# Patient Record
Sex: FEMALE | Race: WHITE | NOT HISPANIC OR LATINO | URBAN - METROPOLITAN AREA
[De-identification: names, ages, dates, MRNs, and addresses within clinical notes are randomized per-mention and may not be internally consistent; named-entity substitution may affect disease eponyms.]

---

## 2019-07-24 ENCOUNTER — INPATIENT (INPATIENT)
Facility: HOSPITAL | Age: 84
LOS: 2 days | Discharge: HOME | DRG: 563 | End: 2019-07-27
Attending: HOSPITALIST | Admitting: HOSPITALIST
Payer: MEDICARE

## 2019-07-24 VITALS — WEIGHT: 132.94 LBS | HEIGHT: 60 IN

## 2019-07-24 DIAGNOSIS — E78.5 HYPERLIPIDEMIA, UNSPECIFIED: ICD-10-CM

## 2019-07-24 DIAGNOSIS — Z96.659 PRESENCE OF UNSPECIFIED ARTIFICIAL KNEE JOINT: Chronic | ICD-10-CM

## 2019-07-24 DIAGNOSIS — Z96.641 PRESENCE OF RIGHT ARTIFICIAL HIP JOINT: Chronic | ICD-10-CM

## 2019-07-24 DIAGNOSIS — E87.1 HYPO-OSMOLALITY AND HYPONATREMIA: ICD-10-CM

## 2019-07-24 DIAGNOSIS — Z90.49 ACQUIRED ABSENCE OF OTHER SPECIFIED PARTS OF DIGESTIVE TRACT: Chronic | ICD-10-CM

## 2019-07-24 DIAGNOSIS — S62.114A NONDISPLACED FRACTURE OF TRIQUETRUM [CUNEIFORM] BONE, RIGHT WRIST, INITIAL ENCOUNTER FOR CLOSED FRACTURE: ICD-10-CM

## 2019-07-24 LAB
ALBUMIN SERPL ELPH-MCNC: 4.3 G/DL — SIGNIFICANT CHANGE UP (ref 3.3–5.2)
ALP SERPL-CCNC: 97 U/L — SIGNIFICANT CHANGE UP (ref 40–120)
ALT FLD-CCNC: 15 U/L — SIGNIFICANT CHANGE UP
ANION GAP SERPL CALC-SCNC: 14 MMOL/L — SIGNIFICANT CHANGE UP (ref 5–17)
ANION GAP SERPL CALC-SCNC: 15 MMOL/L — SIGNIFICANT CHANGE UP (ref 5–17)
ANION GAP SERPL CALC-SCNC: 15 MMOL/L — SIGNIFICANT CHANGE UP (ref 5–17)
APTT BLD: 51.2 SEC — HIGH (ref 27.5–36.3)
AST SERPL-CCNC: 21 U/L — SIGNIFICANT CHANGE UP
BILIRUB SERPL-MCNC: 0.6 MG/DL — SIGNIFICANT CHANGE UP (ref 0.4–2)
BUN SERPL-MCNC: 17 MG/DL — SIGNIFICANT CHANGE UP (ref 8–20)
BUN SERPL-MCNC: 17 MG/DL — SIGNIFICANT CHANGE UP (ref 8–20)
BUN SERPL-MCNC: 18 MG/DL — SIGNIFICANT CHANGE UP (ref 8–20)
CALCIUM SERPL-MCNC: 8.9 MG/DL — SIGNIFICANT CHANGE UP (ref 8.6–10.2)
CALCIUM SERPL-MCNC: 9.5 MG/DL — SIGNIFICANT CHANGE UP (ref 8.6–10.2)
CALCIUM SERPL-MCNC: 9.7 MG/DL — SIGNIFICANT CHANGE UP (ref 8.6–10.2)
CHLORIDE SERPL-SCNC: 80 MMOL/L — LOW (ref 98–107)
CHLORIDE SERPL-SCNC: 81 MMOL/L — LOW (ref 98–107)
CHLORIDE SERPL-SCNC: 85 MMOL/L — LOW (ref 98–107)
CO2 SERPL-SCNC: 17 MMOL/L — LOW (ref 22–29)
CO2 SERPL-SCNC: 20 MMOL/L — LOW (ref 22–29)
CO2 SERPL-SCNC: 21 MMOL/L — LOW (ref 22–29)
CREAT SERPL-MCNC: 0.82 MG/DL — SIGNIFICANT CHANGE UP (ref 0.5–1.3)
CREAT SERPL-MCNC: 0.93 MG/DL — SIGNIFICANT CHANGE UP (ref 0.5–1.3)
CREAT SERPL-MCNC: 0.98 MG/DL — SIGNIFICANT CHANGE UP (ref 0.5–1.3)
GLUCOSE SERPL-MCNC: 117 MG/DL — HIGH (ref 70–115)
GLUCOSE SERPL-MCNC: 133 MG/DL — HIGH (ref 70–115)
GLUCOSE SERPL-MCNC: 150 MG/DL — HIGH (ref 70–115)
HCT VFR BLD CALC: 35.4 % — SIGNIFICANT CHANGE UP (ref 34.5–45)
HGB BLD-MCNC: 12.8 G/DL — SIGNIFICANT CHANGE UP (ref 11.5–15.5)
INR BLD: 4.94 RATIO — HIGH (ref 0.88–1.16)
MAGNESIUM SERPL-MCNC: 1.4 MG/DL — LOW (ref 1.6–2.6)
MCHC RBC-ENTMCNC: 32.7 PG — SIGNIFICANT CHANGE UP (ref 27–34)
MCHC RBC-ENTMCNC: 36.2 GM/DL — HIGH (ref 32–36)
MCV RBC AUTO: 90.3 FL — SIGNIFICANT CHANGE UP (ref 80–100)
OSMOLALITY SERPL: 251 MOSMOL/KG — LOW (ref 280–301)
PLATELET # BLD AUTO: 370 K/UL — SIGNIFICANT CHANGE UP (ref 150–400)
POTASSIUM SERPL-MCNC: 3.3 MMOL/L — LOW (ref 3.5–5.3)
POTASSIUM SERPL-MCNC: 3.4 MMOL/L — LOW (ref 3.5–5.3)
POTASSIUM SERPL-MCNC: 3.4 MMOL/L — LOW (ref 3.5–5.3)
POTASSIUM SERPL-SCNC: 3.3 MMOL/L — LOW (ref 3.5–5.3)
POTASSIUM SERPL-SCNC: 3.4 MMOL/L — LOW (ref 3.5–5.3)
POTASSIUM SERPL-SCNC: 3.4 MMOL/L — LOW (ref 3.5–5.3)
PROT SERPL-MCNC: 7.6 G/DL — SIGNIFICANT CHANGE UP (ref 6.6–8.7)
PROTHROM AB SERPL-ACNC: 59.7 SEC — HIGH (ref 10–12.9)
RBC # BLD: 3.92 M/UL — SIGNIFICANT CHANGE UP (ref 3.8–5.2)
RBC # FLD: 11.9 % — SIGNIFICANT CHANGE UP (ref 10.3–14.5)
SODIUM SERPL-SCNC: 115 MMOL/L — CRITICAL LOW (ref 135–145)
SODIUM SERPL-SCNC: 116 MMOL/L — CRITICAL LOW (ref 135–145)
SODIUM SERPL-SCNC: 117 MMOL/L — CRITICAL LOW (ref 135–145)
T3 SERPL-MCNC: 79 NG/DL — LOW (ref 80–200)
T4 AB SER-ACNC: 9.9 UG/DL — SIGNIFICANT CHANGE UP (ref 4.5–12)
TSH SERPL-MCNC: 3.67 UIU/ML — SIGNIFICANT CHANGE UP (ref 0.27–4.2)
URATE SERPL-MCNC: 3.2 MG/DL — SIGNIFICANT CHANGE UP (ref 2.4–5.7)
WBC # BLD: 13.7 K/UL — HIGH (ref 3.8–10.5)
WBC # FLD AUTO: 13.7 K/UL — HIGH (ref 3.8–10.5)

## 2019-07-24 PROCEDURE — 99221 1ST HOSP IP/OBS SF/LOW 40: CPT

## 2019-07-24 PROCEDURE — 99291 CRITICAL CARE FIRST HOUR: CPT

## 2019-07-24 PROCEDURE — 73110 X-RAY EXAM OF WRIST: CPT | Mod: 26,RT

## 2019-07-24 PROCEDURE — 74022 RADEX COMPL AQT ABD SERIES: CPT | Mod: 26

## 2019-07-24 PROCEDURE — 73200 CT UPPER EXTREMITY W/O DYE: CPT | Mod: 26,RT

## 2019-07-24 PROCEDURE — 93010 ELECTROCARDIOGRAM REPORT: CPT

## 2019-07-24 PROCEDURE — 99285 EMERGENCY DEPT VISIT HI MDM: CPT

## 2019-07-24 RX ORDER — SIMVASTATIN 20 MG/1
20 TABLET, FILM COATED ORAL AT BEDTIME
Refills: 0 | Status: DISCONTINUED | OUTPATIENT
Start: 2019-07-24 | End: 2019-07-25

## 2019-07-24 RX ORDER — ACETAMINOPHEN 500 MG
650 TABLET ORAL EVERY 6 HOURS
Refills: 0 | Status: DISCONTINUED | OUTPATIENT
Start: 2019-07-24 | End: 2019-07-27

## 2019-07-24 RX ORDER — POTASSIUM CHLORIDE 20 MEQ
40 PACKET (EA) ORAL ONCE
Refills: 0 | Status: COMPLETED | OUTPATIENT
Start: 2019-07-24 | End: 2019-07-24

## 2019-07-24 RX ORDER — METOPROLOL TARTRATE 50 MG
25 TABLET ORAL
Refills: 0 | Status: DISCONTINUED | OUTPATIENT
Start: 2019-07-24 | End: 2019-07-25

## 2019-07-24 RX ORDER — MAGNESIUM SULFATE 500 MG/ML
2 VIAL (ML) INJECTION
Refills: 0 | Status: COMPLETED | OUTPATIENT
Start: 2019-07-24 | End: 2019-07-25

## 2019-07-24 RX ORDER — SODIUM CHLORIDE 9 MG/ML
1000 INJECTION INTRAMUSCULAR; INTRAVENOUS; SUBCUTANEOUS
Refills: 0 | Status: DISCONTINUED | OUTPATIENT
Start: 2019-07-24 | End: 2019-07-25

## 2019-07-24 RX ORDER — LOSARTAN POTASSIUM 100 MG/1
50 TABLET, FILM COATED ORAL DAILY
Refills: 0 | Status: DISCONTINUED | OUTPATIENT
Start: 2019-07-24 | End: 2019-07-27

## 2019-07-24 RX ORDER — METOPROLOL TARTRATE 50 MG
50 TABLET ORAL
Refills: 0 | Status: DISCONTINUED | OUTPATIENT
Start: 2019-07-25 | End: 2019-07-25

## 2019-07-24 RX ORDER — CHLORHEXIDINE GLUCONATE 213 G/1000ML
1 SOLUTION TOPICAL
Refills: 0 | Status: DISCONTINUED | OUTPATIENT
Start: 2019-07-24 | End: 2019-07-27

## 2019-07-24 RX ADMIN — Medication 50 GRAM(S): at 23:59

## 2019-07-24 RX ADMIN — Medication 40 MILLIEQUIVALENT(S): at 20:31

## 2019-07-24 RX ADMIN — Medication 25 MILLIGRAM(S): at 23:59

## 2019-07-24 RX ADMIN — SODIUM CHLORIDE 50 MILLILITER(S): 9 INJECTION INTRAMUSCULAR; INTRAVENOUS; SUBCUTANEOUS at 21:30

## 2019-07-24 RX ADMIN — Medication 650 MILLIGRAM(S): at 20:00

## 2019-07-24 RX ADMIN — SIMVASTATIN 20 MILLIGRAM(S): 20 TABLET, FILM COATED ORAL at 23:59

## 2019-07-24 RX ADMIN — SODIUM CHLORIDE 50 MILLILITER(S): 9 INJECTION INTRAMUSCULAR; INTRAVENOUS; SUBCUTANEOUS at 20:00

## 2019-07-24 NOTE — ED ADULT TRIAGE NOTE - CHIEF COMPLAINT QUOTE
c/o right wrist injury, fell on Sunday night, c/o difficulty of using right wrist, denies hitting head and loc, c/o twisting muscles on the lower back , pt on Coumadin for afib,

## 2019-07-24 NOTE — H&P ADULT - NSICDXPASTMEDICALHX_GEN_ALL_CORE_FT
PAST MEDICAL HISTORY:  AF (atrial fibrillation)     Benign essential HTN     Dyslipidemia     Osteoporosis

## 2019-07-24 NOTE — H&P ADULT - ASSESSMENT
1: Severe Hyponatremia: mostly from dehydration with Hypochloremia   2: mechanical fall with right wrist close non displaced fracture   3: Hypokalemia  4: Elevated INR with chronic AC with underlying Chronic Afib  5: HTN/Dyslipidemia/ Hypothyroidism     Patient seen and examined   Full code     Nephro/Electrolyte/Acid-Base:   NS started at 50 cc/hour in ED-> Will check Na q2-3 hours for now and target Na rise about 8-10 mEq in first 24 hours-> if more rapid corection might have to treat with D5W/DDAVP  Will get: Following tests as initial w/u for hyponatremia   > Urine: Na, Creatinine, Osmolarity, Urea  > Serum: Osmolarity, TSH, Cortisol   Avoid nephrotoxic agents  Strict I&O with Cr monitoring   Close Electrolyte monitoring and correction as needed     Neuro:   Pain Mx: PRN Tylenol for now   Sedation/Anxiolytic: PRN Xanax     Cardiovascular:   HDS  Would resume Lopressor 50 AM and 25 PM with Losartan and statin   Holding coumadin for now     Resp/Chest:   On RAn-> oxygenating and ventilating fine for now     Gi/Nutrition:   Diet: Regular diet  IV Zofran as needed   Bowel Regimen as needed from tomorrow     ID:   no active issues       Endocrinology:   Will check TSH and f/u with patient's daughter about dose of synthroid as patient is unsure       Haem/Oncology:   Mechanical  DVT ppx only for now  Check INR in AM prior to resuming Coumadin

## 2019-07-24 NOTE — H&P ADULT - NSHPSOCIALHISTORY_GEN_ALL_CORE
From [place]  Denied any Hx of current tobacco, alcohol or recreational substance abuse   used to smoke 35 years ago  Drinks Glass of wine socially

## 2019-07-24 NOTE — ED STATDOCS - OBJECTIVE STATEMENT
82 y/o F pt with no significant PMHx presents to the ED c/o right wrist pain s/p a fall 3 days ago, as well as constipation. She was getting up in the middle of the night to use the bathroom and fell, hitting her wrist on the wall. Pt visited her doctor, and he told her he does not think it is not broken, but she should get a X-ray. Pt was also told that since she is on Warfarin, she should ensure that she does not have a blood clot. She is scheduled to get on a plane to [place], tomorrow and wants to make sure everything is ok. Last bowel movement was 2 days ago, but it was not normal. She took an X-lax last night but had no relief. Pt's appetite is also diminished. Non-Tobacco User. Denies dysuria. No further complaints at this time.

## 2019-07-24 NOTE — ED ADULT NURSE REASSESSMENT NOTE - NS ED NURSE REASSESS COMMENT FT1
Report given to MICU RN Angelica. Awaiting transport at this time. Pt. remains awake, alert, cooperative and calm.

## 2019-07-24 NOTE — ED STATDOCS - CLINICAL SUMMARY MEDICAL DECISION MAKING FREE TEXT BOX
PT FROM [place] PRESENTS TO [place] R WRIST, HURT THREE DAYSA AGO WHEN SHE FELL GETTING UP AT NIGHT TO GO TO [place]. FURTHER HX REVEALS DECREASED APPETITE, MILD CONFUSION, NOT ACTING HERSELF. CONSTIPATION, NOT EATING RIGHT X 1 MONTH. STATES FLYING HOME TOMORROW TO [place]. ON COUMADIN FOR AFIB. LAST INR ONE MONTH AGO. AGREES TO XRAY AND LABS. PE , OTHER THAN WRIST WAS NONSPECIFIC. LABS XRAY. RESULTS HYPONATREMIA, HYPOKALEMAI, FRACTURE TRIQUETRUM. NEEDS ADMIT ICU AND ORTHO CONSULT - CALLED.

## 2019-07-24 NOTE — DISCHARGE NOTE PROVIDER - NSDCFUADDAPPT_GEN_ALL_CORE_FT
Please follow up with PMD, Dr. Richard, Renal and Ortho. Needs to follow up with Dr. Richard within 1 week for repeat BMP.

## 2019-07-24 NOTE — DISCHARGE NOTE PROVIDER - PROVIDER TOKENS
PROVIDER:[TOKEN:[[id number]]] PROVIDER:[TOKEN:[[id number]]],PROVIDER:[TOKEN:[[id number]],FOLLOWUP:[Routine]],FREE:[LAST:[Jose Manuel],FIRST:[Monique],PHONE:[phone]],FAX:[(   )    -],ADDRESS:[[address], [place] [address]],FOLLOWUP:[1 week]]

## 2019-07-24 NOTE — H&P ADULT - NSHPPHYSICALEXAM_GEN_ALL_CORE
ICU Vital Signs Last 24 Hrs  T(C): 36.4 (24 Jul 2019 14:54), Max: 36.4 (24 Jul 2019 14:54)  T(F): 97.6 (24 Jul 2019 14:54), Max: 97.6 (24 Jul 2019 14:54)  HR: 69 (24 Jul 2019 14:54) (69 - 69)  BP: 105/67 (24 Jul 2019 14:54) (105/67 - 105/67)  BP(mean): --  ABP: --  ABP(mean): --  RR: 18 (24 Jul 2019 14:54) (18 - 18)  SpO2: 98% (24 Jul 2019 14:54) (98% - 98%)    General: No acute distress.  Lying in bed comfortably, Cachectic   Neuro: AAO*3, No motor, sensory, or cranial nerve deficit  HEENT: Pupils equal, reactive to light, Oral mucosa dry  PULM: Clear to auscultation bilaterally, no significant adventitious breath sounds   CVS: Irregular rhythm and controlled rate, no murmurs, rubs, or gallops  ABD: Soft, nondistended, nontender, normoactive bowel sounds, no CVA tenderness  EXT: No b/l LE edema, nontender with pedal pulse palpable; Right forearm splinted   SKIN: Warm and well perfused, no acute rashes

## 2019-07-24 NOTE — ED ADULT NURSE REASSESSMENT NOTE - NS ED NURSE REASSESS COMMENT FT1
Handoff received from offgoing RN. Charting as noted. Pt. returned from CT at this time. Pt. received AxOx4, resting in stretcher, in NAD. Vital signs stable and as charted. R. arm in soft black splint placed by ER provider. Pt. awaiting MICU bed at this time. Pt. complaining of 5/10 back pain. PRN Tylenol to be given. 20g IV placed in L. AC by this RN, pending labs sent. IVF and additional ordered meds to be given. RN educated and updated pt on plan of care. Pt verbalized understanding. RN will continue to update pt on plan of care. Pt. safety and comfort measures maintained. Handoff received from offgoing RN. Charting as noted. Pt. returned from CT at this time. Pt. received AxOx4, resting in stretcher, in NAD. Vital signs stable and as charted. R. arm in soft black brace. Pt. awaiting MICU bed at this time. Pt. complaining of 5/10 back pain. PRN Tylenol to be given. 20g IV placed in L. AC by this RN, pending labs sent. IVF and additional ordered meds to be given. RN educated and updated pt on plan of care. Pt verbalized understanding. RN will continue to update pt on plan of care. Pt. safety and comfort measures maintained.

## 2019-07-24 NOTE — ED STATDOCS - PROGRESS NOTE DETAILS
CHANDU Child NOTE: Pt evaluated at bedside. Possible fx noted to carpal bones on XR, will order CT for better eval due to severe osteoarthritis. Pt evaluated prior by intake physician. Otherwise HPI/PE/ROS as noted above. Will follow up plan per intake physician.

## 2019-07-24 NOTE — DISCHARGE NOTE PROVIDER - NSDCFUADDINST_GEN_ALL_CORE_FT
**DO NOT wet or remove velcro brace.  **Elevate the extremity to reduce swelling.  **No strenuous activities or heavy lifting.  **Non-weightbear of the RIGHT hand/wrist  **Follow up with Dr. Ngo within 1 week; or may follow up with hand specialist if patient returns home to [place] within 1 week

## 2019-07-24 NOTE — DISCHARGE NOTE PROVIDER - HOSPITAL COURSE
Pt is 84 y/o female visiting [place] from [place], with PMHx of AFib on warfarin, HLD, HTN (on triamterene diuretic), osteopetrosis, presented with right wrist injury/ pain after mechanical fall and noted to have right wrist fracture incidental hyponatremia Na 115 which thought to be hypovolumic. Pt was admitted to MICU, started on IVFs, Hyponatremia improving Na 121 now- seen in consultation with nephrology. Pt was seen by hand surgery for distal radius fracture- recommended brace/ NWB right hand/ wrist. Pt downgraded to hospitalist service 7/25/19.        Vital Signs Last 24 Hrs    T(C): 36.6 (26 Jul 2019 08:20), Max: 36.8 (25 Jul 2019 16:00)    T(F): 97.9 (26 Jul 2019 08:20), Max: 98.2 (25 Jul 2019 16:00)    HR: 76 (26 Jul 2019 08:20) (57 - 81)    BP: 134/78 (26 Jul 2019 08:20) (108/56 - 134/78)    BP(mean): 87 (25 Jul 2019 19:00) (74 - 91)    RR: 18 (26 Jul 2019 08:20) (14 - 20)    SpO2: 99% (26 Jul 2019 08:20) (95% - 100%)        PHYSICAL EXAM:    GENERAL: Pt sitting comfortably in bedside chair, in NAD.    CHEST/LUNG: Clear to auscultation bilaterally; No wheezing, rhonchi or rales, respirations unlabored     HEART: S1S2+, irregular, No murmurs    ABDOMEN: Soft, Nontender, Nondistended; Bowel sounds present.    Extremities: No LE edema, pulses+, no motor or sensory loss. Brace to right lower arm/ wrist.     PSYCH: normal mood.        Pt stable. Discharge time 35 minutes Pt is 84 y/o female visiting [place] from [place], with PMHx of AFib on warfarin, HLD, HTN (on triamterene diuretic), osteopetrosis, presented with right wrist injury/ pain after mechanical fall and noted to have right wrist fracture incidental hyponatremia Na 115 which thought to be hypovolumic. Pt was admitted to MICU, started on IVFs, Hyponatremia improving Na 121 now- seen in consultation with nephrology. Pt was seen by hand surgery for distal radius fracture- recommended brace/ NWB right hand/ wrist. Pt clinically improving, hyponatremia also improved.         Vital Signs Last 24 Hrs    T(C): 36.6 (26 Jul 2019 08:20), Max: 36.8 (25 Jul 2019 16:00)    T(F): 97.9 (26 Jul 2019 08:20), Max: 98.2 (25 Jul 2019 16:00)    HR: 76 (26 Jul 2019 08:20) (57 - 81)    BP: 134/78 (26 Jul 2019 08:20) (108/56 - 134/78)    BP(mean): 87 (25 Jul 2019 19:00) (74 - 91)    RR: 18 (26 Jul 2019 08:20) (14 - 20)    SpO2: 99% (26 Jul 2019 08:20) (95% - 100%)        PHYSICAL EXAM:    GENERAL: Pt sitting comfortably in bedside chair, in NAD.    CHEST/LUNG: Clear to auscultation bilaterally; No wheezing, rhonchi or rales, respirations unlabored     HEART: S1S2+, irregular, No murmurs    ABDOMEN: Soft, Nontender, Nondistended; Bowel sounds present.    Extremities: No LE edema, pulses+, no motor or sensory loss. Brace to right lower arm/ wrist.     PSYCH: normal mood.        Pt stable. Discharge time 35 minutes Pt is 84 y/o female visiting [place] from [place], with PMHx of AFib on warfarin, HLD, HTN (on triamterene diuretic), osteopetrosis, presented with right wrist injury/ pain after mechanical fall and noted to have right wrist fracture incidental hyponatremia Na 115 which thought to be hypovolumic. Pt was admitted to MICU, started on IVFs, Hyponatremia improving Na 121 now- seen in consultation with nephrology. Pt was seen by hand surgery for distal radius fracture- recommended brace/ NWB right hand/ wrist. Pt clinically improving, hyponatremia also improved to 132. No further HCTZ- Triamterene  & potassium as per nephrology. Losartan to be continued. Maintain regular diet with restricted fluid intake as per nephrology        Vital Signs Last 24 Hrs    T(C): 36.6 (26 Jul 2019 08:20), Max: 36.8 (25 Jul 2019 16:00)    T(F): 97.9 (26 Jul 2019 08:20), Max: 98.2 (25 Jul 2019 16:00)    HR: 76 (26 Jul 2019 08:20) (57 - 81)    BP: 134/78 (26 Jul 2019 08:20) (108/56 - 134/78)    BP(mean): 87 (25 Jul 2019 19:00) (74 - 91)    RR: 18 (26 Jul 2019 08:20) (14 - 20)    SpO2: 99% (26 Jul 2019 08:20) (95% - 100%)        PHYSICAL EXAM:    GENERAL: Pt sitting comfortably in bedside chair, in NAD.    CHEST/LUNG: Clear to auscultation bilaterally; No wheezing, rhonchi or rales, respirations unlabored     HEART: S1S2+, irregular, No murmurs    ABDOMEN: Soft, Nontender, Nondistended; Bowel sounds present.    Extremities: No LE edema, pulses+, no motor or sensory loss. Brace to right lower arm/ wrist.     PSYCH: normal mood.        Pt stable. Discharge time 35 minutes

## 2019-07-24 NOTE — H&P ADULT - NSICDXPASTSURGICALHX_GEN_ALL_CORE_FT
PAST SURGICAL HISTORY:  H/O total knee replacement     History of appendectomy     History of total right hip replacement

## 2019-07-24 NOTE — H&P ADULT - HISTORY OF PRESENT ILLNESS
84 y/o Female who was in her USOH until 2-3 weeks ago when she had respiratory tract viral infection followed by improvement in health travelled to [place] [place] [facility]\"" 4-5 days ago for vacation from [place] with overall not feeling well and had mechanical fall in middle of night with right wrist injury with worsening pain decided to come to ED for management and was found to have Na of 116-> MICU called for admission.   Currently besides nausea wo vomiting and constipation and wrist pain, patient is denying any other complaints.

## 2019-07-24 NOTE — ED ADULT NURSE NOTE - OBJECTIVE STATEMENT
pt alert and oriented x4 came in c/o falling onto right wrist, brace noted on patient. respirations even unlabored. wrist swollen pain to touch. pt ambulates without difficulty. pt educated on plan of care, pt able to successfully teach back plan of care to RN, RN will continue to reeducate pt during hospital stay.

## 2019-07-24 NOTE — DISCHARGE NOTE PROVIDER - CARE PROVIDERS DIRECT ADDRESSES
,DirectAddress_Unknown ,DirectAddress_Unknown,[email or web address],DirectAddress_Unknown

## 2019-07-24 NOTE — CONSULT NOTE ADULT - SUBJECTIVE AND OBJECTIVE BOX
ORTHO-HAND SERVICE    Pt Name: MATHEW AGUIRRE    MRN: [id number]      Patient is a 83y Female presenting to the emergency department with a chief complaint of right wrist pain, found to be hyponatremic and admitted to medicine service.  Patient seen and examined in the MICU.  Consult requested by ED attending after the received a call about patient's CT scan of the wrist which showed a distal radius fracture.  Patient states about 3 days ago, she was at a friend's house when she fell landing on her right hand.  Patient is RHD.  She had immediate pain and swelling.  Pain/swelling/bruising did not improve and was prompted to go the ED by her friends.  She has pain along the radial aspect of the wrist.  Pain worsens with movement of the wrist.  Pain improves with use of the velcro brace which was applied by the ED Physician Assistant.  She denies numbness/tingling/weakness in the fingers.  She denies radiating pain.  She has no other orthopedic concerns.  Patient is scheduled to return home tomorrow back to [place]  .        REVIEW OF SYSTEMS    General: Alert, responsive, in NAD    Skin/Breast: No rashes, no pruritis   	  Ophthalmologic: No visual changes. No redness.   	  ENMT:	No discharge. No swelling.    Respiratory and Thorax: No difficulty breathing. No cough.  	   Cardiovascular: No chest pain. No palpitations.    Gastrointestinal: No abdominal pain. No diarrhea.     Genitourinary: No dysuria. No bleeding.    Musculoskeletal: SEE HPI.    Neurological: No sensory or motor changes.     Psychiatric: No anxiety or depression.    Hematology/Lymphatics: No swelling.    Endocrine: No Hx of diabetes.    ROS is otherwise negative.    PAST MEDICAL & SURGICAL HISTORY:  PAST MEDICAL & SURGICAL HISTORY:  Osteoporosis  Dyslipidemia  AF (atrial fibrillation)  Benign essential HTN  History of total right hip replacement  H/O total knee replacement  History of appendectomy      Allergies: Allergy Status Unknown      Medications: acetaminophen   Tablet .. 650 milliGRAM(s) Oral every 6 hours PRN  chlorhexidine 4% Liquid 1 Application(s) Topical <User Schedule>  losartan 50 milliGRAM(s) Oral daily  magnesium sulfate  IVPB 2 Gram(s) IV Intermittent every 1 hour  metoprolol tartrate 25 milliGRAM(s) Oral <User Schedule>  simvastatin 20 milliGRAM(s) Oral at bedtime  sodium chloride 0.9%. 1000 milliLiter(s) IV Continuous <Continuous>      FAMILY HISTORY:  No significant family history  : non-contributory    Social History:       Daily Height in cm: 152.4 (2019 14:52)    Daily Weight in k.5 (2019 21:30)                            12.8   13.70 )-----------( 370      ( 2019 17:01 )             35.4           115<LL>  |  80<L>  |  17.0  ----------------------------<  133<H>  3.4<L>   |  20.0<L>  |  0.93    Ca    9.5      2019 20:00  Mg     1.4         TPro  7.6  /  Alb  4.3  /  TBili  0.6  /  DBili  x   /  AST  21  /  ALT  15  /  AlkPhos  97          PHYSICAL EXAM:  Appearance: Alert, responsive, in no acute distress.  Musculoskeletal:       Right Upper Extremity:          Inspection:  Mild bruising noted through out the dorsal aspect of the hand and through out the wrist.  Mild swelling over the radial aspect.  No rash on visible skin. Skin is clean, dry and intact. No bleeding. No abrasions. No ulcerations.          Palpation:  Tenderness to palpation along the distal radius.  No other point tenderness through out the wrist, hand or fingers.          Range of motion:  Painful and restricted ROM on wrist flexion/extension and radial/ulnar deviation.  FROM of the fingers          Neurological: Sensation is grossly intact to light touch. 5/5 motor function of all extremities. No focal deficits or weaknesses found.          Vascular: 2+ distal pulses. Cap refill < 2 sec. No signs of venous  insufficiency  or stasis. No extremity ulcerations. No cyanosis.           Imaging Studies:   EXAM:  WRIST-RIGHT                          PROCEDURE DATE:  2019          INTERPRETATION:  CLINICAL HISTORY:Injury with  Pain    TECHNIQUE: AP, lateral,scaphoid and oblique views of the RIGHT wrist were   obtained.    FINDINGS: There is a possible lung    Fracture through the body of the triquetrum carpal bone. There is   degenerative narrowing of the radiocarpal joint space. There is severe   osteoarthritis of the carpal bones of the lunate and capitate with   narrowing sclerosis and severe first metacarpal carpal joint space   osteoarthritis with narrowing sclerosis and deformity of the trapezium   without acute fracture. Distal radius ulna grossly intact. The soft   tissue swelling adjacent to the ulnar styloid and triquetrum. The   remainder of the visualized bones and soft tissues are unremarkable.    IMPRESSION:Suspect triquetral carpal bone fracture. Severe osteoarthritis.        GALE RICHTER M.D., ATTENDING RADIOLOGIST  This document has been electronically signed. 2019  5:41PM         EXAM:  CT WRIST ONLY RT                          PROCEDURE DATE:  2019          INTERPRETATION:  CT WRIST RIGHT dated 2019 7:21 PM     INDICATION: Status post fall with wrist pain.    COMPARISON: Wrist radiographs of the same date    TECHNIQUE: CT imaging of the right wrist was performed. The data was   reformatted in the axial, coronal, and sagittal planes. Additionally, 3-D   reformatted imaging was created at a separate workstation.    FINDINGS:    OSSEOUS STRUCTURES: Tear is a mildly impacted fracture the distal radius   with minimal nondisplaced extension to the articular surface. No   additional fracture is identified. No additional fracture is noted. There   is severe narrowing at the basilar joint with marginal productive   changes. There is narrowing and productive changes at the STT joint.   Moderate narrowing of the lunocapitate articulation. There is narrowing   and spurring at the pisiform-triquetral joint. There is   chondrocalcinosis. Moderate diffuse narrowing and productive changes   across the metacarpophalangeal joints.  SYNOVIUM/ JOINT FLUID: A moderate to large radiocarpal joint effusion is   noted decompressing along the volar aspect of the ulnar wrist.  TENDONS: The tendons are intact. No full-thickness tear or retraction is   noted.  MUSCLES: Normal muscle bulk without intramuscular edema.  NEUROVASCULAR STRUCTURES: Preserved  SUBCUTANEOUS SOFT TISSUES: Mild soft tissue swelling about the wrist.    3-D reformatted imaging confirms these findings.    IMPRESSION:    1.  Nondisplaced mildly impacted, intra-articular distal radial fracture.  2.  Soft tissue swelling.  3.  Degenerative changes as above.  4.  Chondrocalcinosis is nonspecific but can be seen in the setting of   CPPD arthropathy.  5.  Radiocarpal joint effusion.          A/P:  Pt is a  83y Female with right wrist pain   found to have right distal radius fracture    PLAN:   * Findings and treatment options discussed with the patient.  X-rays and CT scan were reviewed  * Patient to continue use of the velcro wrist brace at all times  * Pain control as clinically indicated  * NWB right hand/wrist  * Ice/elevation to help with pain/swelling  * Continue care as per primary team  * No acute orthopedic surgical intervention at this time  * Patient to follow up outpatient within 1 week for repeat x-rays and further treatment plan.  Patient lives in [place] and may follow up with specialist there or Dr. Ngo if she remains on [place]  * Case d/w with Dr. Ngo

## 2019-07-24 NOTE — DISCHARGE NOTE PROVIDER - CARE PROVIDER_API CALL
Isabella Ngo)  Orthopedics  [address] [place] [address], [address]  [place] [address]  Phone: [phone]  Fax: [phone]  Follow Up Time: Isabella Ngo)  Orthopedics  [address] [facility], [address]  [place] [address]  Phone: [phone]  Fax: [phone]  Follow Up Time:     Natalie Martínez)  Internal Medicine; Nephrology  [address] [place] [address]  Phone: [phone]  Fax: [phone]  Follow Up Time: Monique Peguero  [address], [place] [address]  Phone: [phone]  Fax: (   )    -  Follow Up Time: 1 week

## 2019-07-24 NOTE — DISCHARGE NOTE PROVIDER - NSDCCPCAREPLAN_GEN_ALL_CORE_FT
PRINCIPAL DISCHARGE DIAGNOSIS  Diagnosis: Hyponatremia  Assessment and Plan of Treatment: f/u with nephrology and PMD for repeat blood work in 1 week. Educated to not take diuretic. BP stable on current medications      SECONDARY DISCHARGE DIAGNOSES  Diagnosis: Closed nondisplaced fracture of triquetrum of right wrist, initial encounter  Assessment and Plan of Treatment: f/u with Orthopedics outpatient    Diagnosis: Lung nodule  Assessment and Plan of Treatment: incidental finding on Left upper lobe nodule. Low probability of malignancy. Pt aware of need for repeat imaging outpatient PRINCIPAL DISCHARGE DIAGNOSIS  Diagnosis: Hyponatremia  Assessment and Plan of Treatment: f/u with nephrology and PMD for repeat blood work in 1 week. Educated to not take diuretic. BP stable on current medications   No further HCTZ/ Triamterene  & potassium as per nephrology. Losartan to be continued. Maintain regular diet with restricted fluid intake (< 1Liter) as per nephrology      SECONDARY DISCHARGE DIAGNOSES  Diagnosis: Lung nodule  Assessment and Plan of Treatment: incidental finding on Left upper lobe nodule. Low probability of malignancy. Pt aware of need for repeat imaging outpatient    Diagnosis: Closed nondisplaced fracture of triquetrum of right wrist, initial encounter  Assessment and Plan of Treatment: f/u with Orthopedics outpatient

## 2019-07-25 LAB
ANION GAP SERPL CALC-SCNC: 11 MMOL/L — SIGNIFICANT CHANGE UP (ref 5–17)
ANION GAP SERPL CALC-SCNC: 12 MMOL/L — SIGNIFICANT CHANGE UP (ref 5–17)
ANION GAP SERPL CALC-SCNC: 13 MMOL/L — SIGNIFICANT CHANGE UP (ref 5–17)
ANION GAP SERPL CALC-SCNC: 14 MMOL/L — SIGNIFICANT CHANGE UP (ref 5–17)
APPEARANCE UR: CLEAR — SIGNIFICANT CHANGE UP
APTT BLD: 51.1 SEC — HIGH (ref 27.5–36.3)
BILIRUB UR-MCNC: NEGATIVE — SIGNIFICANT CHANGE UP
BUN SERPL-MCNC: 14 MG/DL — SIGNIFICANT CHANGE UP (ref 8–20)
BUN SERPL-MCNC: 15 MG/DL — SIGNIFICANT CHANGE UP (ref 8–20)
CALCIUM SERPL-MCNC: 8.9 MG/DL — SIGNIFICANT CHANGE UP (ref 8.6–10.2)
CALCIUM SERPL-MCNC: 9 MG/DL — SIGNIFICANT CHANGE UP (ref 8.6–10.2)
CALCIUM SERPL-MCNC: 9.4 MG/DL — SIGNIFICANT CHANGE UP (ref 8.6–10.2)
CALCIUM SERPL-MCNC: 9.7 MG/DL — SIGNIFICANT CHANGE UP (ref 8.6–10.2)
CHLORIDE SERPL-SCNC: 87 MMOL/L — LOW (ref 98–107)
CHLORIDE SERPL-SCNC: 88 MMOL/L — LOW (ref 98–107)
CHLORIDE SERPL-SCNC: 90 MMOL/L — LOW (ref 98–107)
CHLORIDE SERPL-SCNC: 95 MMOL/L — LOW (ref 98–107)
CO2 SERPL-SCNC: 18 MMOL/L — LOW (ref 22–29)
CO2 SERPL-SCNC: 19 MMOL/L — LOW (ref 22–29)
COLOR SPEC: YELLOW — SIGNIFICANT CHANGE UP
CREAT ?TM UR-MCNC: 19 MG/DL — SIGNIFICANT CHANGE UP
CREAT SERPL-MCNC: 0.82 MG/DL — SIGNIFICANT CHANGE UP (ref 0.5–1.3)
CREAT SERPL-MCNC: 0.87 MG/DL — SIGNIFICANT CHANGE UP (ref 0.5–1.3)
CREAT SERPL-MCNC: 0.9 MG/DL — SIGNIFICANT CHANGE UP (ref 0.5–1.3)
CREAT SERPL-MCNC: 0.94 MG/DL — SIGNIFICANT CHANGE UP (ref 0.5–1.3)
DIFF PNL FLD: NEGATIVE — SIGNIFICANT CHANGE UP
EPI CELLS # UR: SIGNIFICANT CHANGE UP
GLUCOSE SERPL-MCNC: 113 MG/DL — SIGNIFICANT CHANGE UP (ref 70–115)
GLUCOSE SERPL-MCNC: 118 MG/DL — HIGH (ref 70–115)
GLUCOSE SERPL-MCNC: 122 MG/DL — HIGH (ref 70–115)
GLUCOSE SERPL-MCNC: 125 MG/DL — HIGH (ref 70–115)
GLUCOSE UR QL: NEGATIVE MG/DL — SIGNIFICANT CHANGE UP
HCT VFR BLD CALC: 32.1 % — LOW (ref 34.5–45)
HGB BLD-MCNC: 11.4 G/DL — LOW (ref 11.5–15.5)
INR BLD: 3.84 RATIO — HIGH (ref 0.88–1.16)
KETONES UR-MCNC: NEGATIVE — SIGNIFICANT CHANGE UP
LEUKOCYTE ESTERASE UR-ACNC: ABNORMAL
MAGNESIUM SERPL-MCNC: 1.3 MG/DL — LOW (ref 1.6–2.6)
MAGNESIUM SERPL-MCNC: 2.7 MG/DL — HIGH (ref 1.6–2.6)
MAGNESIUM SERPL-MCNC: 3.1 MG/DL — HIGH (ref 1.6–2.6)
MCHC RBC-ENTMCNC: 31.4 PG — SIGNIFICANT CHANGE UP (ref 27–34)
MCHC RBC-ENTMCNC: 35.5 GM/DL — SIGNIFICANT CHANGE UP (ref 32–36)
MCV RBC AUTO: 88.4 FL — SIGNIFICANT CHANGE UP (ref 80–100)
NITRITE UR-MCNC: NEGATIVE — SIGNIFICANT CHANGE UP
OSMOLALITY UR: 147 MOSM/KG — LOW (ref 300–1000)
PH UR: 7 — SIGNIFICANT CHANGE UP (ref 5–8)
PHOSPHATE SERPL-MCNC: 3 MG/DL — SIGNIFICANT CHANGE UP (ref 2.4–4.7)
PLATELET # BLD AUTO: 322 K/UL — SIGNIFICANT CHANGE UP (ref 150–400)
POTASSIUM SERPL-MCNC: 3.1 MMOL/L — LOW (ref 3.5–5.3)
POTASSIUM SERPL-MCNC: 3.5 MMOL/L — SIGNIFICANT CHANGE UP (ref 3.5–5.3)
POTASSIUM SERPL-MCNC: 3.6 MMOL/L — SIGNIFICANT CHANGE UP (ref 3.5–5.3)
POTASSIUM SERPL-MCNC: 4.6 MMOL/L — SIGNIFICANT CHANGE UP (ref 3.5–5.3)
POTASSIUM SERPL-SCNC: 3.1 MMOL/L — LOW (ref 3.5–5.3)
POTASSIUM SERPL-SCNC: 3.5 MMOL/L — SIGNIFICANT CHANGE UP (ref 3.5–5.3)
POTASSIUM SERPL-SCNC: 3.6 MMOL/L — SIGNIFICANT CHANGE UP (ref 3.5–5.3)
POTASSIUM SERPL-SCNC: 4.6 MMOL/L — SIGNIFICANT CHANGE UP (ref 3.5–5.3)
PROT UR-MCNC: NEGATIVE MG/DL — SIGNIFICANT CHANGE UP
PROTHROM AB SERPL-ACNC: 46 SEC — HIGH (ref 10–12.9)
RBC # BLD: 3.63 M/UL — LOW (ref 3.8–5.2)
RBC # FLD: 11.5 % — SIGNIFICANT CHANGE UP (ref 10.3–14.5)
RBC CASTS # UR COMP ASSIST: NEGATIVE /HPF — SIGNIFICANT CHANGE UP (ref 0–4)
SODIUM SERPL-SCNC: 118 MMOL/L — CRITICAL LOW (ref 135–145)
SODIUM SERPL-SCNC: 121 MMOL/L — LOW (ref 135–145)
SODIUM SERPL-SCNC: 121 MMOL/L — LOW (ref 135–145)
SODIUM SERPL-SCNC: 125 MMOL/L — LOW (ref 135–145)
SODIUM UR-SCNC: 33 MMOL/L — SIGNIFICANT CHANGE UP
SP GR SPEC: 1 — LOW (ref 1.01–1.02)
UROBILINOGEN FLD QL: NEGATIVE MG/DL — SIGNIFICANT CHANGE UP
UUN UR-MCNC: 136 MG/DL — SIGNIFICANT CHANGE UP
WBC # BLD: 10.49 K/UL — SIGNIFICANT CHANGE UP (ref 3.8–10.5)
WBC # FLD AUTO: 10.49 K/UL — SIGNIFICANT CHANGE UP (ref 3.8–10.5)
WBC UR QL: SIGNIFICANT CHANGE UP

## 2019-07-25 PROCEDURE — 99223 1ST HOSP IP/OBS HIGH 75: CPT

## 2019-07-25 PROCEDURE — 99233 SBSQ HOSP IP/OBS HIGH 50: CPT

## 2019-07-25 RX ORDER — POLYETHYLENE GLYCOL 3350 17 G/17G
17 POWDER, FOR SOLUTION ORAL ONCE
Refills: 0 | Status: COMPLETED | OUTPATIENT
Start: 2019-07-25 | End: 2019-07-25

## 2019-07-25 RX ORDER — LOSARTAN POTASSIUM 100 MG/1
1 TABLET, FILM COATED ORAL
Qty: 0 | Refills: 0 | DISCHARGE

## 2019-07-25 RX ORDER — TRIAMTERENE 100 MG/1
0.5 CAPSULE ORAL
Qty: 0 | Refills: 0 | DISCHARGE

## 2019-07-25 RX ORDER — POTASSIUM CHLORIDE 20 MEQ
40 PACKET (EA) ORAL ONCE
Refills: 0 | Status: COMPLETED | OUTPATIENT
Start: 2019-07-25 | End: 2019-07-25

## 2019-07-25 RX ORDER — LEVOTHYROXINE SODIUM 125 MCG
75 TABLET ORAL DAILY
Refills: 0 | Status: DISCONTINUED | OUTPATIENT
Start: 2019-07-25 | End: 2019-07-27

## 2019-07-25 RX ORDER — ATORVASTATIN CALCIUM 80 MG/1
1 TABLET, FILM COATED ORAL
Qty: 0 | Refills: 0 | DISCHARGE

## 2019-07-25 RX ORDER — LEVOTHYROXINE SODIUM 125 MCG
1 TABLET ORAL
Qty: 0 | Refills: 0 | DISCHARGE

## 2019-07-25 RX ORDER — POTASSIUM CHLORIDE 20 MEQ
20 PACKET (EA) ORAL DAILY
Refills: 0 | Status: DISCONTINUED | OUTPATIENT
Start: 2019-07-25 | End: 2019-07-27

## 2019-07-25 RX ORDER — OMEPRAZOLE 10 MG/1
1 CAPSULE, DELAYED RELEASE ORAL
Qty: 0 | Refills: 0 | DISCHARGE

## 2019-07-25 RX ORDER — POTASSIUM CHLORIDE 20 MEQ
10 PACKET (EA) ORAL
Refills: 0 | Status: DISCONTINUED | OUTPATIENT
Start: 2019-07-25 | End: 2019-07-25

## 2019-07-25 RX ORDER — WARFARIN SODIUM 2.5 MG/1
1 TABLET ORAL
Qty: 0 | Refills: 0 | DISCHARGE

## 2019-07-25 RX ORDER — SODIUM CHLORIDE 9 MG/ML
1000 INJECTION INTRAMUSCULAR; INTRAVENOUS; SUBCUTANEOUS
Refills: 0 | Status: DISCONTINUED | OUTPATIENT
Start: 2019-07-25 | End: 2019-07-25

## 2019-07-25 RX ORDER — SENNA PLUS 8.6 MG/1
2 TABLET ORAL AT BEDTIME
Refills: 0 | Status: DISCONTINUED | OUTPATIENT
Start: 2019-07-25 | End: 2019-07-27

## 2019-07-25 RX ORDER — SODIUM CHLORIDE 9 MG/ML
1000 INJECTION INTRAMUSCULAR; INTRAVENOUS; SUBCUTANEOUS
Refills: 0 | Status: DISCONTINUED | OUTPATIENT
Start: 2019-07-25 | End: 2019-07-26

## 2019-07-25 RX ORDER — SODIUM CHLORIDE 9 MG/ML
1 INJECTION INTRAMUSCULAR; INTRAVENOUS; SUBCUTANEOUS THREE TIMES A DAY
Refills: 0 | Status: DISCONTINUED | OUTPATIENT
Start: 2019-07-25 | End: 2019-07-27

## 2019-07-25 RX ORDER — METOPROLOL TARTRATE 50 MG
1 TABLET ORAL
Qty: 0 | Refills: 0 | DISCHARGE

## 2019-07-25 RX ORDER — ALENDRONATE SODIUM 70 MG/1
1 TABLET ORAL
Qty: 0 | Refills: 0 | DISCHARGE

## 2019-07-25 RX ORDER — ATORVASTATIN CALCIUM 80 MG/1
20 TABLET, FILM COATED ORAL AT BEDTIME
Refills: 0 | Status: DISCONTINUED | OUTPATIENT
Start: 2019-07-25 | End: 2019-07-27

## 2019-07-25 RX ADMIN — SODIUM CHLORIDE 1 GRAM(S): 9 INJECTION INTRAMUSCULAR; INTRAVENOUS; SUBCUTANEOUS at 22:10

## 2019-07-25 RX ADMIN — Medication 20 MILLIEQUIVALENT(S): at 16:31

## 2019-07-25 RX ADMIN — ATORVASTATIN CALCIUM 20 MILLIGRAM(S): 80 TABLET, FILM COATED ORAL at 22:10

## 2019-07-25 RX ADMIN — Medication 650 MILLIGRAM(S): at 17:30

## 2019-07-25 RX ADMIN — POLYETHYLENE GLYCOL 3350 17 GRAM(S): 17 POWDER, FOR SOLUTION ORAL at 12:27

## 2019-07-25 RX ADMIN — LOSARTAN POTASSIUM 50 MILLIGRAM(S): 100 TABLET, FILM COATED ORAL at 06:02

## 2019-07-25 RX ADMIN — Medication 75 MICROGRAM(S): at 06:00

## 2019-07-25 RX ADMIN — Medication 100 MILLIEQUIVALENT(S): at 06:03

## 2019-07-25 RX ADMIN — Medication 650 MILLIGRAM(S): at 22:10

## 2019-07-25 RX ADMIN — SODIUM CHLORIDE 100 MILLILITER(S): 9 INJECTION INTRAMUSCULAR; INTRAVENOUS; SUBCUTANEOUS at 12:28

## 2019-07-25 RX ADMIN — SODIUM CHLORIDE 100 MILLILITER(S): 9 INJECTION INTRAMUSCULAR; INTRAVENOUS; SUBCUTANEOUS at 18:38

## 2019-07-25 RX ADMIN — CHLORHEXIDINE GLUCONATE 1 APPLICATION(S): 213 SOLUTION TOPICAL at 05:58

## 2019-07-25 RX ADMIN — Medication 40 MILLIEQUIVALENT(S): at 11:27

## 2019-07-25 RX ADMIN — Medication 1 DROP(S): at 11:17

## 2019-07-25 RX ADMIN — Medication 650 MILLIGRAM(S): at 07:01

## 2019-07-25 RX ADMIN — Medication 650 MILLIGRAM(S): at 16:30

## 2019-07-25 RX ADMIN — SODIUM CHLORIDE 100 MILLILITER(S): 9 INJECTION INTRAMUSCULAR; INTRAVENOUS; SUBCUTANEOUS at 20:17

## 2019-07-25 RX ADMIN — Medication 650 MILLIGRAM(S): at 23:00

## 2019-07-25 RX ADMIN — Medication 50 GRAM(S): at 01:07

## 2019-07-25 RX ADMIN — Medication 40 MILLIEQUIVALENT(S): at 06:36

## 2019-07-25 RX ADMIN — SENNA PLUS 2 TABLET(S): 8.6 TABLET ORAL at 22:09

## 2019-07-25 RX ADMIN — Medication 10 MILLIGRAM(S): at 11:17

## 2019-07-25 RX ADMIN — Medication 40 MILLIEQUIVALENT(S): at 18:36

## 2019-07-25 RX ADMIN — Medication 650 MILLIGRAM(S): at 06:01

## 2019-07-25 NOTE — PROGRESS NOTE ADULT - SUBJECTIVE AND OBJECTIVE BOX
MICU downgrade to hospitalist Service Acceptance note:    MATHEW AGUIRRE Female 83y [id number]    Patient is a 83y old  Female who presents with a chief complaint of Hyponatremia (2019 15:22)      MICU Course:  Pt is 82 y/o female visiting [place] from [place], with PMHx of AFib   on warfarin, HTN (on a thiazide diuretic), osteopetrosis HLD, presented with right wrist injury/ pain after mechanical fall and noted to have right wrist fracture incidental hyponatremia Na 115 which thought to be hypovolumic. Pt was admitted to MICU, started on IVFs, Hyponatremia improving Na 121 now- seen in consultation with nephrology. Pt was seen by hand surgery for distal radius fracture- recommended brace/ NWB right hand/ wrist. Pt downgraded to hospitalist service today 19.        PHYSICAL EXAM:    Vital Signs Last 24 Hrs  T(C): 36.5 (2019 11:10), Max: 36.7 (2019 19:50)  T(F): 97.7 (2019 11:10), Max: 98 (2019 19:50)  HR: 67 (2019 16:00) (45 - 81)  BP: 127/67 (2019 16:00) (96/63 - 161/71)  BP(mean): 91 (2019 16:00) (74 - 108)  RR: 16 (2019 16:00) (12 - 25)  SpO2: 96% (2019 16:00) (94% - 100%)    GENERAL: Pt lying comfortably, NAD.  CHEST/LUNG: Clear to auscultation bilaterally; No wheezing.  HEART: S1S2+, irregular, No murmurs.  ABDOMEN: Soft, Nontender, Nondistended; Bowel sounds present.  Extremities: No LE edema, pulses+ no motor r sensory loss.  PSYCH: normal mood.      MEDICATIONS  (STANDING):  atorvastatin 20 milliGRAM(s) Oral at bedtime  chlorhexidine 4% Liquid 1 Application(s) Topical <User Schedule>  levothyroxine 75 MICROGram(s) Oral daily  losartan 50 milliGRAM(s) Oral daily  potassium chloride    Tablet ER 20 milliEquivalent(s) Oral daily  senna 2 Tablet(s) Oral at bedtime  sodium chloride 1 Gram(s) Oral three times a day  sodium chloride 0.9%. 1000 milliLiter(s) (100 mL/Hr) IV Continuous <Continuous>    MEDICATIONS  (PRN):  acetaminophen   Tablet .. 650 milliGRAM(s) Oral every 6 hours PRN Mild Pain (1 - 3), Moderate Pain (4 - 6)  artificial  tears Solution 1 Drop(s) Both EYES every 4 hours PRN Dry Eyes        Labs:  LABS:                        11.4   10.49 )-----------( 322      ( 2019 03:49 )             32.1     07-25    121<L>  |  90<L>  |  15.0  ----------------------------<  125<H>  3.6   |  18.0<L>  |  0.90    Ca    9.4      2019 15:34  Phos  3.0     07-  Mg     2.7     07-    TPro  7.6  /  Alb  4.3  /  TBili  0.6  /  DBili  x   /  AST  21  /  ALT  15  /  AlkPhos  97  07-24    PT/INR - ( 2019 03:49 )   PT: 46.0 sec;   INR: 3.84 ratio         PTT - ( 2019 03:49 )  PTT:51.1 sec    LIVER FUNCTIONS - ( 2019 17:01 )  Alb: 4.3 g/dL / Pro: 7.6 g/dL / ALK PHOS: 97 U/L / ALT: 15 U/L / AST: 21 U/L / GGT: x           Urinalysis Basic - ( 2019 03:44 )    Color: Yellow / Appearance: Clear / S.005 / pH: x  Gluc: x / Ketone: Negative  / Bili: Negative / Urobili: Negative mg/dL   Blood: x / Protein: Negative mg/dL / Nitrite: Negative   Leuk Esterase: Trace / RBC: Negative /HPF / WBC 0-2   Sq Epi: x / Non Sq Epi: Occasional / Bacteria: x MICU downgrade to hospitalist Service Acceptance note:    MATHEW AGUIRRE Female 83y [id number]    Patient is a 83y old  Female who presents with a chief complaint of Hyponatremia (2019 15:22)      MICU Course:  Pt is 82 y/o female visiting [place] from [place], with PMHx of AFib on warfarin, HLD, HTN (on triamterene diuretic), osteopetrosis, presented with right wrist injury/ pain after mechanical fall and noted to have right wrist fracture incidental hyponatremia Na 115 which thought to be hypovolumic. Pt was admitted to MICU, started on IVFs, Hyponatremia improving Na 121 now- seen in consultation with nephrology. Pt was seen by hand surgery for distal radius fracture- recommended brace/ NWB right hand/ wrist. Pt downgraded to hospitalist service today 19.        PHYSICAL EXAM:    Vital Signs Last 24 Hrs  T(C): 36.5 (2019 11:10), Max: 36.7 (2019 19:50)  T(F): 97.7 (2019 11:10), Max: 98 (2019 19:50)  HR: 67 (2019 16:00) (45 - 81)  BP: 127/67 (2019 16:00) (96/63 - 161/71)  BP(mean): 91 (2019 16:00) (74 - 108)  RR: 16 (2019 16:00) (12 - 25)  SpO2: 96% (2019 16:00) (94% - 100%)    GENERAL: Pt lying comfortably, NAD.  CHEST/LUNG: Clear to auscultation bilaterally; No wheezing.  HEART: S1S2+, irregular, No murmurs.  ABDOMEN: Soft, Nontender, Nondistended; Bowel sounds present.  Extremities: No LE edema, pulses+ no motor r sensory loss. Brace to right lower arm/ wrist.   PSYCH: normal mood.      MEDICATIONS  (STANDING):  atorvastatin 20 milliGRAM(s) Oral at bedtime  chlorhexidine 4% Liquid 1 Application(s) Topical <User Schedule>  levothyroxine 75 MICROGram(s) Oral daily  losartan 50 milliGRAM(s) Oral daily  potassium chloride    Tablet ER 20 milliEquivalent(s) Oral daily  senna 2 Tablet(s) Oral at bedtime  sodium chloride 1 Gram(s) Oral three times a day  sodium chloride 0.9%. 1000 milliLiter(s) (100 mL/Hr) IV Continuous <Continuous>    MEDICATIONS  (PRN):  acetaminophen   Tablet .. 650 milliGRAM(s) Oral every 6 hours PRN Mild Pain (1 - 3), Moderate Pain (4 - 6)  artificial  tears Solution 1 Drop(s) Both EYES every 4 hours PRN Dry Eyes        Labs:  LABS:                        11.4   10.49 )-----------( 322      ( 2019 03:49 )             32.1     07-25    121<L>  |  90<L>  |  15.0  ----------------------------<  125<H>  3.6   |  18.0<L>  |  0.90    Ca    9.4      2019 15:34  Phos  3.0     07-  Mg     2.7     07-    TPro  7.6  /  Alb  4.3  /  TBili  0.6  /  DBili  x   /  AST  21  /  ALT  15  /  AlkPhos  97  07-24    PT/INR - ( 2019 03:49 )   PT: 46.0 sec;   INR: 3.84 ratio         PTT - ( 2019 03:49 )  PTT:51.1 sec    LIVER FUNCTIONS - ( 2019 17:01 )  Alb: 4.3 g/dL / Pro: 7.6 g/dL / ALK PHOS: 97 U/L / ALT: 15 U/L / AST: 21 U/L / GGT: x           Urinalysis Basic - ( 2019 03:44 )    Color: Yellow / Appearance: Clear / S.005 / pH: x  Gluc: x / Ketone: Negative  / Bili: Negative / Urobili: Negative mg/dL   Blood: x / Protein: Negative mg/dL / Nitrite: Negative   Leuk Esterase: Trace / RBC: Negative /HPF / WBC 0-2   Sq Epi: x / Non Sq Epi: Occasional / Bacteria: x

## 2019-07-25 NOTE — CONSULT NOTE ADULT - ASSESSMENT
84 yo female with hx of afib on warfarin, HTN (on a thiazide diuretic), osteoperosis, HLD, presented with right wrist fracture after a fall, found to have hyponatremia.     Hyponatremia likely due to poor po intake in addition to thiazide diuretic use. Improving with IV hydration.    Also noted to have 3mm KATHIE nodule on CXR. needs Further W.U,      She expressed understanding of the plan.     DX : Low Solute Intake,     ? Reset Osmostat,    ? Resolving SIADH ( Thiazide )     Plan:  - Avoid HCTZ - For future,     Repeat Urine studies,  Oral Na+ * Kcl,

## 2019-07-25 NOTE — PROGRESS NOTE ADULT - ASSESSMENT
84 yo female with hx of afib on warfarin, HTN (on a thiazide diuretic), osteoperosis, HLD, presented with right wrist fracture after a fall, found to have hyponatremia.     Hyponatremia likely due to poor po intake in addition to thiazide diuretic use. Improving with IV hydration.    Also noted to have 3mm KATHIE nodule on cxr. 84 yo female with hx of afib on warfarin, HTN (on a thiazide diuretic), osteoperosis, HLD, presented with right wrist fracture after a fall, found to have hyponatremia.     Hyponatremia likely due to poor po intake in addition to thiazide diuretic use. Improving with IV hydration.    Also noted to have 3mm KATHIE nodule on cxr.  Patient notified that she need follow up imaging.  She says she live in [place] and would prefer to follow up there for repeat imaging.  We will give her a copy of the CD with the CXR.  She expressed understanding of the plan.     Plan:  - gentle hydration with NS, stop HCTZ  - nephrology following  - ortho following for wrist fracture  - daily INR, restart warfarin when INR 2-3  - holding metoprolol for now due to bradycardia   Will downrade from MICU

## 2019-07-25 NOTE — CONSULT NOTE ADULT - SUBJECTIVE AND OBJECTIVE BOX
Patient is a 83 y old  Female who presents with a chief complaint of Hyponatremia (2019 10:42)    HPI:  84 y/o Female who was in her USOH until 2-3 weeks ago when she had respiratory tract viral infection followed by improvement in health travelled to [facility] 4-5 days ago for vacation from [place] with overall not feeling well and had mechanical fall in middle of night with right wrist injury with worsening pain decided to come to ED for management and was found to have Na of 116-> MICU called for admission.   Currently besides nausea , W/O  vomiting and constipation and wrist pain, patient is denying any other complaints. (2019 19:08)    PAST MEDICAL & SURGICAL HISTORY:    Osteoporosis  Dyslipidemia  AF (atrial fibrillation)  Benign essential HTN    History of total right hip replacement  H/O total knee replacement  History of appendectomy    FAMILY HISTORY:  No significant family history    Social History: Non Smoker,   MEDICATIONS  (STANDING):  atorvastatin 20 milliGRAM(s) Oral at bedtime  chlorhexidine 4% Liquid 1 Application(s) Topical <User Schedule>  levothyroxine 75 MICROGram(s) Oral daily  losartan 50 milliGRAM(s) Oral daily  potassium chloride    Tablet ER 20 milliEquivalent(s) Oral daily  senna 2 Tablet(s) Oral at bedtime  sodium chloride 1 Gram(s) Oral three times a day  sodium chloride 0.9%. 1000 milliLiter(s) (100 mL/Hr) IV Continuous <Continuous>    MEDICATIONS  (PRN):  acetaminophen   Tablet .. 650 milliGRAM(s) Oral every 6 hours PRN Mild Pain (1 - 3), Moderate Pain (4 - 6)  artificial  tears Solution 1 Drop(s) Both EYES every 4 hours PRN Dry Eyes    Allergies    Allergy Status Unknown    REVIEW OF SYSTEMS:    CONSTITUTIONAL: No fever, weight loss, + fatigue  EYES: No eye pain, visual disturbances, or discharge  ENMT:  No difficulty hearing, tinnitus, vertigo; No sinus or throat pain  NECK: No pain or stiffness  BREASTS: No pain, masses, or nipple discharge  RESPIRATORY: No cough, wheezing, chills or hemoptysis; No shortness of breath  CARDIOVASCULAR: No chest pain, palpitations, dizziness, or leg swelling  GASTROINTESTINAL: No abdominal or epigastric pain. + nausea,  No vomiting, or hematemesis; No diarrhea or constipation. No melena or hematochezia.  GENITOURINARY: No dysuria, frequency, hematuria, or incontinence  NEUROLOGICAL: No headaches, memory loss, loss of strength, numbness, or tremors  SKIN: No itching, burning, rashes, or lesions   LYMPH NODES: No enlarged glands  ENDOCRINE: No heat or cold intolerance; No hair loss  MUSCULOSKELETAL: No joint pain or swelling; No muscle, back, or extremity pain  PSYCHIATRIC: No depression, anxiety, mood swings, or difficulty sleeping  HEME/LYMPH: No easy bruising, or bleeding gums  ALLERGY AND IMMUNOLOGIC: No hives or eczema    Vital Signs Last 24 Hrs  T(C): 36.5 (2019 11:10), Max: 36.7 (2019 19:50)  T(F): 97.7 (2019 11:10), Max: 98 (2019 19:50)  HR: 81 (2019 14:00) (45 - 81)  BP: 110/53 (2019 14:00) (96/63 - 161/71)  BP(mean): 77 (2019 14:00) (74 - 108)  RR: 17 (2019 14:00) (12 - 25)  SpO2: 98% (2019 14:00) (94% - 100%)    PHYSICAL EXAM:    GENERAL: NAD, Frail, Elderly, Pale,   HEAD:  Atraumatic, Normocephalic  EYES: EOMI, PERRLA, conjunctiva and sclera clear  ENMT: Moist mucous membranes,   NECK: Supple, No JVD,   NERVOUS SYSTEM:  Alert & Oriented X3,   CHEST/LUNG: Clear to percussion bilaterally; No rales, rhonchi, wheezing, or rubs  HEART: Irregular rate and rhythm; No murmurs, rubs, or gallops  ABDOMEN: Soft, Nontender, Nondistended; Bowel sounds present  EXTREMITIES:  2+ Peripheral Pulses, No clubbing, cyanosis, or edema  LYMPH: No lymphadenopathy noted  SKIN: No rashes or lesions    LABS:                        11.4   10.49 )-----------( 322      ( 2019 03:49 )             32.1     07-    121<L>  |  88<L>  |  14.0  ----------------------------<  113  3.5   |  19.0<L>  |  0.87    Ca    9.7      2019 09:30  Phos  3.0       Mg     2.7         TPro  7.6  /  Alb  4.3  /  TBili  0.6  /  DBili  x   /  AST  21  /  ALT  15  /  AlkPhos  97      PT/INR - ( 2019 03:49 )   PT: 46.0 sec;   INR: 3.84 ratio      PTT - ( 2019 03:49 )  PTT:51.1 sec  Urinalysis Basic - ( 2019 03:44 )    Color: Yellow / Appearance: Clear / S.005 / pH: x  Gluc: x / Ketone: Negative  / Bili: Negative / Urobili: Negative mg/dL   Blood: x / Protein: Negative mg/dL / Nitrite: Negative   Leuk Esterase: Trace / RBC: Negative /HPF / WBC 0-2   Sq Epi: x / Non Sq Epi: Occasional / Bacteria: x    Magnesium, Serum: 2.7 mg/dL ( @ 09:30)  Magnesium, Serum: 3.1 mg/dL ( @ 03:49)  Phosphorus Level, Serum: 3.0 mg/dL ( @ 03:49)  Magnesium, Serum: 1.3 mg/dL ( @ 23:17)  Magnesium, Serum: 1.4 mg/dL ( @ 20:00)    RADIOLOGY & ADDITIONAL TESTS:    EXAM:  THREE POSITION ABDOMEN W CHEST                          PROCEDURE DATE:  2019      INTERPRETATION:  CLINICAL INFORMATION: constipatation.    EXAM:   Supine and upright abdomen.    AP chest    COMPARISON: None.    FINDINGS:  No dilated air-filled small bowel loops to suggest small bowel   obstruction. Gaseous distention of the colon.  There is no evidence of intraperitoneal free air.  Status post right total hip arthroplasty. Scoliosis.    There is a 3 mm left upper lobe nodule. No pleural effusion or   pneumothorax. Cardiomediastinal silhouette is within normal limits.    IMPRESSION:   1.  Gaseous distention of the colon.  2.  A 3 mm left upper lobe nodule. If the patient is at low risk for   malignancy, no further follow-up is needed. If the patient is at high   risk for malignancy, optional CT chest can be obtained.    These findings were discussed with CHANDU Armstrong at 2019 9:22 AM by Dr. Rj Chris with read back confirmation.

## 2019-07-25 NOTE — PROGRESS NOTE ADULT - SUBJECTIVE AND OBJECTIVE BOX
INTERVAL HPI/OVERNIGHT EVENTS: No major events, no new complaints     PHYSICAL EXAM:  GENERAL: NAD   HEAD:  Atraumatic, normocephalic  EYES: EOMI, PERRL, sclera and conjunctiva clear  ENMT:  MMM, No oropharyngeal erythema or exudates  NECK:  Supple, normal appearance, trachea midline, no JVD noted  NERVOUS SYSTEM:  Alert & Oriented X3, Symmetric strength in all extremities    CHEST/LUNG: Bilateral air entry, no chest deformity, no wheeze  HEART: Regular rate, regular rhythm; No murmurs, rubs, or gallops  ABDOMEN: Soft, Nontender, Nondistended; No rebound or guarding, bowel sounds present  EXTREMITIES:  No edema, no clubbing, no cyanosis.  Right wrist immobilized.   LYMPH:  No lymphadenopathy noted  SKIN:  No visible rashes or skin lesions, good capillary refill  PSYCH: appropriate affect and behavior    RADIOLOGY personally reviewed:   GOALS OF CARE DISCUSSION WITH PATIENT/FAMILY/PROXY:   CRITICAL CARE TIME SPENT:     On Admission  19 (1d)  HPI:  84 y/o Female who was in her USOH until 2-3 weeks ago when she had respiratory tract viral infection followed by improvement in health travelled to [facility] 4-5 days ago for vacation from [place] with overall not feeling well and had mechanical fall in middle of night with right wrist injury with worsening pain decided to come to ED for management and was found to have Na of 116-> MICU called for admission.   Currently besides nausea wo vomiting and constipation and wrist pain, patient is denying any other complaints. (2019 19:08)    PAST MEDICAL & SURGICAL HISTORY:  Osteoporosis  Dyslipidemia  AF (atrial fibrillation)  Benign essential HTN  History of total right hip replacement  H/O total knee replacement  History of appendectomy      Antimicrobial:    Cardiovascular:  losartan 50 milliGRAM(s) Oral daily    Pulmonary:    Hematalogic:    Other:  acetaminophen   Tablet .. 650 milliGRAM(s) Oral every 6 hours PRN  atorvastatin 20 milliGRAM(s) Oral at bedtime  chlorhexidine 4% Liquid 1 Application(s) Topical <User Schedule>  levothyroxine 75 MICROGram(s) Oral daily  potassium chloride    Tablet ER 40 milliEquivalent(s) Oral once  sodium chloride 0.9%. 1000 milliLiter(s) IV Continuous <Continuous>      Drug Dosing Weight  Height (cm): 165.1 (2019 21:30)  Weight (kg): 59.5 (2019 21:30)  BMI (kg/m2): 21.8 (2019 21:30)  BSA (m2): 1.65 (2019 21:30)    T(C): 36.3 (19 @ 09:20), Max: 36.7 (19 @ 19:50)  HR: 50 (19 @ 07:00)  BP: 112/56 (19 @ 07:00)  BP(mean): 78 (19 @ 07:00)  ABP: --  ABP(mean): --  RR: 18 (19 @ 07:00)  SpO2: 99% (19 @ 07:00)           @ 07:01  -   @ 07:00  --------------------------------------------------------  IN: 550 mL / OUT: 800 mL / NET: -250 mL              LABS:  CBC Full  -  ( 2019 03:49 )  WBC Count : 10.49 K/uL  RBC Count : 3.63 M/uL  Hemoglobin : 11.4 g/dL  Hematocrit : 32.1 %  Platelet Count - Automated : 322 K/uL  Mean Cell Volume : 88.4 fl  Mean Cell Hemoglobin : 31.4 pg  Mean Cell Hemoglobin Concentration : 35.5 gm/dL  Auto Neutrophil # : x  Auto Lymphocyte # : x  Auto Monocyte # : x  Auto Eosinophil # : x  Auto Basophil # : x  Auto Neutrophil % : x  Auto Lymphocyte % : x  Auto Monocyte % : x  Auto Eosinophil % : x  Auto Basophil % : x        121<L>  |  88<L>  |  14.0  ----------------------------<  113  3.5   |  19.0<L>  |  0.87    Ca    9.7      2019 09:30  Phos  3.0       Mg     2.7         TPro  7.6  /  Alb  4.3  /  TBili  0.6  /  DBili  x   /  AST  21  /  ALT  15  /  AlkPhos  97  24    PT/INR - ( 2019 03:49 )   PT: 46.0 sec;   INR: 3.84 ratio         PTT - ( 2019 03:49 )  PTT:51.1 sec  Urinalysis Basic - ( 2019 03:44 )    Color: Yellow / Appearance: Clear / S.005 / pH: x  Gluc: x / Ketone: Negative  / Bili: Negative / Urobili: Negative mg/dL   Blood: x / Protein: Negative mg/dL / Nitrite: Negative   Leuk Esterase: Trace / RBC: Negative /HPF / WBC 0-2   Sq Epi: x / Non Sq Epi: Occasional / Bacteria: x

## 2019-07-25 NOTE — PROVIDER CONTACT NOTE (EICU) - ACTION/TREATMENT ORDERED:
KCl 10 dionne IVSS x3, KCl 40 dionne PO  as per [facility] ICU standard

## 2019-07-25 NOTE — PROGRESS NOTE ADULT - ASSESSMENT
Pt is 84 y/o female visiting [place] from [place], with PMHx of AFib   on warfarin, HTN (on a thiazide diuretic), osteopetrosis HLD, presented with right wrist injury/ pain after mechanical fall and noted to have right wrist fracture incidental hyponatremia Na 115 which thought to be hypovolumic. Pt was admitted to MICU, started on IVFs, Hyponatremia improving Na 121 now- seen in consultation with nephrology. Pt was seen by hand surgery for distal radius fracture- recommended brace/ NWB right hand/ wrist. Pt downgraded to hospitalist service today 7/25/19. Pt is 82 y/o female visiting [place] from [place], with PMHx of AFib on warfarin, HLD, HTN (on triamterene diuretic), osteopetrosis, presented with right wrist injury/ pain after mechanical fall and noted to have right wrist fracture incidental hyponatremia Na 115 which thought to be hypovolumic. Pt was admitted to MICU, started on IVFs, Hyponatremia improving Na 121 now- seen in consultation with nephrology. Pt was seen by hand surgery for distal radius fracture- recommended brace/ NWB right hand/ wrist. Pt downgraded to hospitalist service today 7/25/19.    >Hypovolumic hyponatremia:  - Pt reports poor intake for 7 days prior to admission.   - Na improving on IVFs.   - Na 121 now, IVF has been increased to 100 by renal today.   - C/w IVFs  - Repeat BMP at 9 PM.   - Renal following.   - Continue to hold home diuretics.     >Distal radius fracture s/p mechanical fall- Seen by ortho, conservative treatment with brace.     >Supra-therapeutic INR- Improving, <4 today. Hold home coumadin, repeat INR tomorrow.     >Afib- Rate controlled, HR in 60s, her metoprolol has been on hold, coumadin also on hold due to high INR.     >HTN- C/w Losartan. BB/ Triamterene on hold.    >HLD- Statins    >Pulmonary nodule- Noted on abdomen xray. Check CXR. O/p pulmonary / PMD follow up on close check on nodule. She prefers to follow up wiht her PMD in [place].     >DVT ppx- Supra-therapeutic INR.

## 2019-07-26 LAB
ANION GAP SERPL CALC-SCNC: 10 MMOL/L — SIGNIFICANT CHANGE UP (ref 5–17)
BUN SERPL-MCNC: 12 MG/DL — SIGNIFICANT CHANGE UP (ref 8–20)
CALCIUM SERPL-MCNC: 8.8 MG/DL — SIGNIFICANT CHANGE UP (ref 8.6–10.2)
CHLORIDE SERPL-SCNC: 102 MMOL/L — SIGNIFICANT CHANGE UP (ref 98–107)
CO2 SERPL-SCNC: 16 MMOL/L — LOW (ref 22–29)
CREAT SERPL-MCNC: 0.86 MG/DL — SIGNIFICANT CHANGE UP (ref 0.5–1.3)
GLUCOSE SERPL-MCNC: 107 MG/DL — SIGNIFICANT CHANGE UP (ref 70–115)
HCT VFR BLD CALC: 34.5 % — SIGNIFICANT CHANGE UP (ref 34.5–45)
HGB BLD-MCNC: 11.6 G/DL — SIGNIFICANT CHANGE UP (ref 11.5–15.5)
INR BLD: 3.29 RATIO — HIGH (ref 0.88–1.16)
MCHC RBC-ENTMCNC: 31.7 PG — SIGNIFICANT CHANGE UP (ref 27–34)
MCHC RBC-ENTMCNC: 33.6 GM/DL — SIGNIFICANT CHANGE UP (ref 32–36)
MCV RBC AUTO: 94.3 FL — SIGNIFICANT CHANGE UP (ref 80–100)
PLATELET # BLD AUTO: 322 K/UL — SIGNIFICANT CHANGE UP (ref 150–400)
POTASSIUM SERPL-MCNC: 4.4 MMOL/L — SIGNIFICANT CHANGE UP (ref 3.5–5.3)
POTASSIUM SERPL-SCNC: 4.4 MMOL/L — SIGNIFICANT CHANGE UP (ref 3.5–5.3)
PROTHROM AB SERPL-ACNC: 39.3 SEC — HIGH (ref 10–12.9)
RBC # BLD: 3.66 M/UL — LOW (ref 3.8–5.2)
RBC # FLD: 11.9 % — SIGNIFICANT CHANGE UP (ref 10.3–14.5)
SODIUM SERPL-SCNC: 128 MMOL/L — LOW (ref 135–145)
WBC # BLD: 8.89 K/UL — SIGNIFICANT CHANGE UP (ref 3.8–10.5)
WBC # FLD AUTO: 8.89 K/UL — SIGNIFICANT CHANGE UP (ref 3.8–10.5)

## 2019-07-26 PROCEDURE — 99233 SBSQ HOSP IP/OBS HIGH 50: CPT

## 2019-07-26 PROCEDURE — 99232 SBSQ HOSP IP/OBS MODERATE 35: CPT

## 2019-07-26 RX ORDER — SODIUM CHLORIDE 9 MG/ML
1000 INJECTION INTRAMUSCULAR; INTRAVENOUS; SUBCUTANEOUS
Refills: 0 | Status: DISCONTINUED | OUTPATIENT
Start: 2019-07-26 | End: 2019-07-26

## 2019-07-26 RX ORDER — SODIUM BICARBONATE 1 MEQ/ML
0.25 SYRINGE (ML) INTRAVENOUS
Qty: 150 | Refills: 0 | Status: DISCONTINUED | OUTPATIENT
Start: 2019-07-26 | End: 2019-07-27

## 2019-07-26 RX ADMIN — ATORVASTATIN CALCIUM 20 MILLIGRAM(S): 80 TABLET, FILM COATED ORAL at 23:21

## 2019-07-26 RX ADMIN — Medication 75 MICROGRAM(S): at 07:02

## 2019-07-26 RX ADMIN — SODIUM CHLORIDE 1 GRAM(S): 9 INJECTION INTRAMUSCULAR; INTRAVENOUS; SUBCUTANEOUS at 23:21

## 2019-07-26 RX ADMIN — SODIUM CHLORIDE 1 GRAM(S): 9 INJECTION INTRAMUSCULAR; INTRAVENOUS; SUBCUTANEOUS at 07:02

## 2019-07-26 RX ADMIN — Medication 100 MEQ/KG/HR: at 18:48

## 2019-07-26 RX ADMIN — LOSARTAN POTASSIUM 50 MILLIGRAM(S): 100 TABLET, FILM COATED ORAL at 07:02

## 2019-07-26 RX ADMIN — SENNA PLUS 2 TABLET(S): 8.6 TABLET ORAL at 23:21

## 2019-07-26 RX ADMIN — SODIUM CHLORIDE 100 MILLILITER(S): 9 INJECTION INTRAMUSCULAR; INTRAVENOUS; SUBCUTANEOUS at 09:50

## 2019-07-26 RX ADMIN — Medication 650 MILLIGRAM(S): at 09:22

## 2019-07-26 RX ADMIN — Medication 650 MILLIGRAM(S): at 08:43

## 2019-07-26 RX ADMIN — CHLORHEXIDINE GLUCONATE 1 APPLICATION(S): 213 SOLUTION TOPICAL at 07:04

## 2019-07-26 RX ADMIN — Medication 20 MILLIEQUIVALENT(S): at 11:57

## 2019-07-26 RX ADMIN — SODIUM CHLORIDE 1 GRAM(S): 9 INJECTION INTRAMUSCULAR; INTRAVENOUS; SUBCUTANEOUS at 11:57

## 2019-07-26 RX ADMIN — Medication 650 MILLIGRAM(S): at 23:24

## 2019-07-26 RX ADMIN — Medication 650 MILLIGRAM(S): at 15:35

## 2019-07-26 RX ADMIN — Medication 650 MILLIGRAM(S): at 15:05

## 2019-07-26 NOTE — PROGRESS NOTE ADULT - ATTENDING COMMENTS
Plan of care discussed with Pt and her daughter at bedside.
Plan of care discussed with Pt and her daughter at bedside.

## 2019-07-26 NOTE — PROGRESS NOTE ADULT - SUBJECTIVE AND OBJECTIVE BOX
[facility] DIVISION OF KIDNEY DISEASES AND HYPERTENSION -- FOLLOW UP NOTE  --------------------------------------------------------------------------------  Chief Complaint:  Hyponatremia, S/P Fall    24 hour events/subjective:  Pt seen and examined  NAD        PAST HISTORY  --------------------------------------------------------------------------------  No significant changes to PMH, PSH, FHx, SHx, unless otherwise noted    ALLERGIES & MEDICATIONS  --------------------------------------------------------------------------------  Allergies    Allergy Status Unknown    Intolerances      Standing Inpatient Medications  atorvastatin 20 milliGRAM(s) Oral at bedtime  chlorhexidine 4% Liquid 1 Application(s) Topical <User Schedule>  levothyroxine 75 MICROGram(s) Oral daily  losartan 50 milliGRAM(s) Oral daily  potassium chloride    Tablet ER 20 milliEquivalent(s) Oral daily  senna 2 Tablet(s) Oral at bedtime  sodium chloride 1 Gram(s) Oral three times a day  sodium chloride 0.9%. 1000 milliLiter(s) IV Continuous <Continuous>  sodium chloride 0.9%. 1000 milliLiter(s) IV Continuous <Continuous>    PRN Inpatient Medications  acetaminophen   Tablet .. 650 milliGRAM(s) Oral every 6 hours PRN  artificial  tears Solution 1 Drop(s) Both EYES every 4 hours PRN      REVIEW OF SYSTEMS  --------------------------------------------------------------------------------  Gen: No weight changes, + fatigue, fevers/chills, weakness  Skin: No rashes  Head/Eyes/Ears/Mouth: No headache; Normal hearing; Normal vision w/o blurriness; No sinus pain/discomfort, sore throat  Respiratory: No dyspnea, cough, wheezing, hemoptysis  CV: No chest pain, PND, orthopnea  GI: No abdominal pain, diarrhea, constipation, nausea, vomiting, melena, hematochezia  : No increased frequency, dysuria, hematuria, nocturia  MSK: No joint pain/swelling; no back pain; no edema  Neuro: No dizziness/lightheadedness, weakness, seizures, numbness, tingling  Heme: No easy bruising or bleeding  Endo: No heat/cold intolerance  Psych: No significant nervousness, anxiety, stress, depression    All other systems were reviewed and are negative, except as noted.    VITALS/PHYSICAL EXAM  --------------------------------------------------------------------------------  T(C): 36.6 (07-26-19 @ 08:20), Max: 36.8 (07-25-19 @ 16:00)  HR: 76 (07-26-19 @ 08:20) (57 - 81)  BP: 134/78 (07-26-19 @ 08:20) (108/56 - 134/78)  RR: 18 (07-26-19 @ 08:20) (14 - 20)  SpO2: 99% (07-26-19 @ 08:20) (94% - 100%)  Wt(kg): --  Height (cm): 165.1 (07-24-19 @ 21:30)  Weight (kg): 59.5 (07-24-19 @ 21:30)  BMI (kg/m2): 21.8 (07-24-19 @ 21:30)  BSA (m2): 1.65 (07-24-19 @ 21:30)      07-25-19 @ 07:01  -  07-26-19 @ 07:00  --------------------------------------------------------  IN: 1920 mL / OUT: 850 mL / NET: 1070 mL      Physical Exam:                Gen: NAD, frail, elderly, pale  	HEENT: PERRL, supple neck, clear oropharynx  	Pulm: CTA B/L  	CV: IRR, S1S2; no rubs, murmurs or gallops  	Back: No spinal or CVA tenderness; no sacral edema  	Abd: +BS, soft, nontender/nondistended  	: No suprapubic tenderness  	UE: Warm, FROM, no clubbing, intact strength; no edema; no asterixis  	LE: Warm, FROM, no clubbing, intact strength; no edema  	Neuro: No focal deficits, intact gait  	Psych: Normal affect and mood  	Skin: Warm, without rashes  	Vascular access:  N/A    LABS/STUDIES  --------------------------------------------------------------------------------              11.4   10.49 >-----------<  322      [07-25-19 @ 03:49]              32.1     125  |  95  |  15.0  ----------------------------<  118      [07-25-19 @ 22:21]  4.6   |  19.0  |  0.94        Ca     8.9     [07-25-19 @ 22:21]      Mg     2.7     [07-25-19 @ 09:30]      Phos  3.0     [07-25-19 @ 03:49]    TPro  7.6  /  Alb  4.3  /  TBili  0.6  /  DBili  x   /  AST  21  /  ALT  15  /  AlkPhos  97  [07-24-19 @ 17:01]    PT/INR: PT 46.0 , INR 3.84       [07-25-19 @ 03:49]  PTT: 51.1       [07-25-19 @ 03:49]    Uric acid 3.2      [07-24-19 @ 20:00]  Serum Osmolality 251      [07-24-19 @ 20:00]    Creatinine Trend:  SCr 0.94 [07-25 @ 22:21]  SCr 0.90 [07-25 @ 15:34]  SCr 0.87 [07-25 @ 09:30]  SCr 0.82 [07-25 @ 03:49]  SCr 0.82 [07-24 @ 23:17]    Urinalysis - [07-25-19 @ 03:44]      Color Yellow / Appearance Clear / SG 1.005 / pH 7.0      Gluc Negative / Ketone Negative  / Bili Negative / Urobili Negative       Blood Negative / Protein Negative / Leuk Est Trace / Nitrite Negative      RBC Negative / WBC 0-2 / Hyaline  / Gran  / Sq Epi  / Non Sq Epi Occasional / Bacteria     Urine Creatinine 19      [07-25-19 @ 03:42]  Urine Sodium 33      [07-25-19 @ 03:42]  Urine Urea Nitrogen 136      [07-25-19 @ 18:44]  Urine Osmolality 147      [07-25-19 @ 03:43]    TSH 3.67      [07-24-19 @ 20:00]

## 2019-07-26 NOTE — PROGRESS NOTE ADULT - ASSESSMENT
1. Hyponatremia  2. AFIB  3. HTN  4. HLD  5. Osteoporosis  6. S/P fall w/R wrist fx       Hyponatremia likely due to poor po intake in addition to thiazide diuretic use  Continue IV NS  On oral Na+ and Kcl  Avoid HCTZ - For future   Repeat Urine studies  Serum Na+ 125 today  Continue current management

## 2019-07-26 NOTE — PROGRESS NOTE ADULT - ASSESSMENT
Pt is 82 y/o female visiting [place] from [place], with PMHx of AFib on warfarin, HLD, HTN (on triamterene diuretic), osteopetrosis, presented with right wrist injury/ pain after mechanical fall and noted to have right wrist fracture incidental hyponatremia Na 115 which thought to be hypovolumic. Pt was admitted to MICU, started on IVFs, Hyponatremia improving Na 121 now- seen in consultation with nephrology. Pt was seen by hand surgery for distal radius fracture- recommended brace/ NWB right hand/ wrist. Pt downgraded to hospitalist service  7/25/19.    >Hypovolumic hyponatremia:  - Pt reports poor intake for 7 days prior to admission.   - Na improving on IVFs.   - Na 115 on admission, now 128. Bicarb dropped to 16   - Change IVFs to sodium bicarb.   - Repeat BMP in AM  - Renal following.   - Continue to hold home diuretics.     >Metabolic acidosis:  - Bicarb dropped to 16.   - Change fluid to bicarb infusion.   Check lactate level.     >Distal radius fracture s/p mechanical fall- Seen by ortho, conservative treatment with brace.     >Supra-therapeutic INR- INR 3.29 today, Hold home coumadin, repeat INR tomorrow.     >Afib- Rate controlled, HR in 60s, her metoprolol has been on hold, coumadin also on hold due to high INR. Resume coumadin based on her INR tomorrow.     >HTN- C/w Losartan. BB/ Triamterene on hold as above.    >HLD- Statins    >Pulmonary nodule- Noted on abdomen xray. Check CXR. O/p pulmonary / PMD follow up on close check on nodule. She prefers to follow up wiht her PMD in [place].     >DVT ppx- Supra-therapeutic INR.     >Dispo- Eventual home, she is visiting from [place], wants to leave tomorrow. Na 128 now however Bicarb dropped to 16, changed IVFs to sodium bicarb. Repeat BMP in AM- If improving then d/c tomorrow.

## 2019-07-26 NOTE — PROGRESS NOTE ADULT - SUBJECTIVE AND OBJECTIVE BOX
MATHEW AGUIRRE Female 83y [id number]    Patient is a 83y old  Female who presents with a chief complaint of Hyponatremia (2019 11:51)      Off service note:  Pt seen and examined at bedside, here for hyponatremia, no over night event reported by night staff. Pt reports feeling better, more energetic and appetite improving.     Review of system:  No fever, chills, nausea, vomiting, headache, dizziness, chest pain, SOB or palpitation.      PHYSICAL EXAM:    Vital Signs Last 24 Hrs  T(C): 36.6 (2019 08:20), Max: 36.6 (2019 08:20)  T(F): 97.9 (2019 08:20), Max: 97.9 (2019 08:20)  HR: 76 (2019 08:20) (58 - 77)  BP: 134/78 (2019 08:20) (118/75 - 134/78)  BP(mean): 87 (2019 19:00) (80 - 90)  RR: 18 (2019 08:20) (14 - 19)  SpO2: 99% (2019 08:20) (95% - 100%)      GENERAL: Pt lying comfortably, NAD.  CHEST/LUNG: Clear to auscultation bilaterally; No wheezing.  HEART: S1S2+, irregular, No murmurs.  ABDOMEN: Soft, Nontender, Nondistended; Bowel sounds present.  Extremities: No LE edema, pulses+ no motor r sensory loss. Brace to right lower arm/ wrist.   PSYCH: normal mood.      MEDICATIONS  (STANDING):  atorvastatin 20 milliGRAM(s) Oral at bedtime  chlorhexidine 4% Liquid 1 Application(s) Topical <User Schedule>  levothyroxine 75 MICROGram(s) Oral daily  losartan 50 milliGRAM(s) Oral daily  potassium chloride    Tablet ER 20 milliEquivalent(s) Oral daily  senna 2 Tablet(s) Oral at bedtime  sodium bicarbonate  Infusion 0.252 mEq/kG/Hr (100 mL/Hr) IV Continuous <Continuous>  sodium chloride 1 Gram(s) Oral three times a day    MEDICATIONS  (PRN):  acetaminophen   Tablet .. 650 milliGRAM(s) Oral every 6 hours PRN Mild Pain (1 - 3), Moderate Pain (4 - 6)  artificial  tears Solution 1 Drop(s) Both EYES every 4 hours PRN Dry Eyes        Labs:  LABS:                        11.6   8.89  )-----------( 322      ( 2019 12:00 )             34.5     07-26    128<L>  |  102  |  12.0  ----------------------------<  107  4.4   |  16.0<L>  |  0.86    Ca    8.8      2019 12:00  Phos  3.0     07-25  Mg     2.7     07-    TPro  7.6  /  Alb  4.3  /  TBili  0.6  /  DBili  x   /  AST  21  /  ALT  15  /  AlkPhos  97  07-24    PT/INR - ( 2019 12:00 )   PT: 39.3 sec;   INR: 3.29 ratio         PTT - ( 2019 03:49 )  PTT:51.1 sec    LIVER FUNCTIONS - ( 2019 17:01 )  Alb: 4.3 g/dL / Pro: 7.6 g/dL / ALK PHOS: 97 U/L / ALT: 15 U/L / AST: 21 U/L / GGT: x           Urinalysis Basic - ( 2019 03:44 )    Color: Yellow / Appearance: Clear / S.005 / pH: x  Gluc: x / Ketone: Negative  / Bili: Negative / Urobili: Negative mg/dL   Blood: x / Protein: Negative mg/dL / Nitrite: Negative   Leuk Esterase: Trace / RBC: Negative /HPF / WBC 0-2   Sq Epi: x / Non Sq Epi: Occasional / Bacteria: x

## 2019-07-27 VITALS
SYSTOLIC BLOOD PRESSURE: 153 MMHG | DIASTOLIC BLOOD PRESSURE: 85 MMHG | RESPIRATION RATE: 18 BRPM | TEMPERATURE: 98 F | OXYGEN SATURATION: 100 % | HEART RATE: 95 BPM

## 2019-07-27 LAB
ANION GAP SERPL CALC-SCNC: 11 MMOL/L — SIGNIFICANT CHANGE UP (ref 5–17)
BUN SERPL-MCNC: 8 MG/DL — SIGNIFICANT CHANGE UP (ref 8–20)
CALCIUM SERPL-MCNC: 8.7 MG/DL — SIGNIFICANT CHANGE UP (ref 8.6–10.2)
CHLORIDE SERPL-SCNC: 99 MMOL/L — SIGNIFICANT CHANGE UP (ref 98–107)
CO2 SERPL-SCNC: 22 MMOL/L — SIGNIFICANT CHANGE UP (ref 22–29)
CREAT SERPL-MCNC: 0.82 MG/DL — SIGNIFICANT CHANGE UP (ref 0.5–1.3)
GLUCOSE SERPL-MCNC: 120 MG/DL — HIGH (ref 70–115)
INR BLD: 2.65 RATIO — HIGH (ref 0.88–1.16)
POTASSIUM SERPL-MCNC: 3.9 MMOL/L — SIGNIFICANT CHANGE UP (ref 3.5–5.3)
POTASSIUM SERPL-SCNC: 3.9 MMOL/L — SIGNIFICANT CHANGE UP (ref 3.5–5.3)
PROTHROM AB SERPL-ACNC: 31.4 SEC — HIGH (ref 10–12.9)
SODIUM SERPL-SCNC: 132 MMOL/L — LOW (ref 135–145)

## 2019-07-27 PROCEDURE — 83735 ASSAY OF MAGNESIUM: CPT

## 2019-07-27 PROCEDURE — 81001 URINALYSIS AUTO W/SCOPE: CPT

## 2019-07-27 PROCEDURE — 82570 ASSAY OF URINE CREATININE: CPT

## 2019-07-27 PROCEDURE — 73200 CT UPPER EXTREMITY W/O DYE: CPT

## 2019-07-27 PROCEDURE — 84436 ASSAY OF TOTAL THYROXINE: CPT

## 2019-07-27 PROCEDURE — 36415 COLL VENOUS BLD VENIPUNCTURE: CPT

## 2019-07-27 PROCEDURE — 84540 ASSAY OF URINE/UREA-N: CPT

## 2019-07-27 PROCEDURE — 84300 ASSAY OF URINE SODIUM: CPT

## 2019-07-27 PROCEDURE — 83935 ASSAY OF URINE OSMOLALITY: CPT

## 2019-07-27 PROCEDURE — 99239 HOSP IP/OBS DSCHRG MGMT >30: CPT

## 2019-07-27 PROCEDURE — 84480 ASSAY TRIIODOTHYRONINE (T3): CPT

## 2019-07-27 PROCEDURE — 85610 PROTHROMBIN TIME: CPT

## 2019-07-27 PROCEDURE — 73110 X-RAY EXAM OF WRIST: CPT

## 2019-07-27 PROCEDURE — 84550 ASSAY OF BLOOD/URIC ACID: CPT

## 2019-07-27 PROCEDURE — 80053 COMPREHEN METABOLIC PANEL: CPT

## 2019-07-27 PROCEDURE — 85730 THROMBOPLASTIN TIME PARTIAL: CPT

## 2019-07-27 PROCEDURE — 80048 BASIC METABOLIC PNL TOTAL CA: CPT

## 2019-07-27 PROCEDURE — 99233 SBSQ HOSP IP/OBS HIGH 50: CPT

## 2019-07-27 PROCEDURE — 83930 ASSAY OF BLOOD OSMOLALITY: CPT

## 2019-07-27 PROCEDURE — 85027 COMPLETE CBC AUTOMATED: CPT

## 2019-07-27 PROCEDURE — 99285 EMERGENCY DEPT VISIT HI MDM: CPT

## 2019-07-27 PROCEDURE — 74022 RADEX COMPL AQT ABD SERIES: CPT

## 2019-07-27 PROCEDURE — 84100 ASSAY OF PHOSPHORUS: CPT

## 2019-07-27 PROCEDURE — 84443 ASSAY THYROID STIM HORMONE: CPT

## 2019-07-27 PROCEDURE — 93005 ELECTROCARDIOGRAM TRACING: CPT

## 2019-07-27 RX ORDER — TRIAMTERENE/HYDROCHLOROTHIAZID 75 MG-50MG
0.5 TABLET ORAL
Qty: 0 | Refills: 0 | DISCHARGE

## 2019-07-27 RX ADMIN — Medication 650 MILLIGRAM(S): at 05:25

## 2019-07-27 RX ADMIN — Medication 650 MILLIGRAM(S): at 12:00

## 2019-07-27 RX ADMIN — Medication 75 MICROGRAM(S): at 05:24

## 2019-07-27 RX ADMIN — LOSARTAN POTASSIUM 50 MILLIGRAM(S): 100 TABLET, FILM COATED ORAL at 05:24

## 2019-07-27 RX ADMIN — Medication 650 MILLIGRAM(S): at 11:51

## 2019-07-27 RX ADMIN — SODIUM CHLORIDE 1 GRAM(S): 9 INJECTION INTRAMUSCULAR; INTRAVENOUS; SUBCUTANEOUS at 14:24

## 2019-07-27 RX ADMIN — Medication 100 MEQ/KG/HR: at 06:08

## 2019-07-27 RX ADMIN — Medication 650 MILLIGRAM(S): at 00:15

## 2019-07-27 RX ADMIN — SODIUM CHLORIDE 1 GRAM(S): 9 INJECTION INTRAMUSCULAR; INTRAVENOUS; SUBCUTANEOUS at 05:25

## 2019-07-27 RX ADMIN — Medication 20 MILLIEQUIVALENT(S): at 14:25

## 2019-07-27 NOTE — PROGRESS NOTE ADULT - ASSESSMENT
Pt is 84 y/o female visiting [place] from [place], with PMH of AFib on warfarin, HLD, HTN (on triamterene diuretic), osteopetrosis, presented with right wrist injury/ pain after mechanical fall and noted to have right wrist fracture incidental hyponatremia Na 115 which thought to be hypovolumic. Pt was admitted to MICU, started on IVFs, Hyponatremia improving Na 121 now- seen in consultation with nephrology. Pt was seen by hand surgery for distal radius fracture- recommended brace/ NWB right hand/ wrist. Pt downgraded to hospitalist service  7/25/19.    >Multi Factorial Hyponatremia:                 - Pt reports poor intake for 7 days prior to admission.                  - Continue to hold diuretics. On K-cl & Nacl.,     >Distal radius fracture s/p mechanical fall- conservative treatment with brace.     >Afib- Rate controlled, HR in 60s, On BB & AC,     >HTN- C/w Losartan.  Pulmonary F.U , ( Lung Nodule )     Will No longer routinely Follow,    TY,

## 2019-07-27 NOTE — DISCHARGE NOTE NURSING/CASE MANAGEMENT/SOCIAL WORK - NSDCDPATPORTLINK_GEN_ALL_CORE
You can access the [facility] Patient Portal, offered by [facility], by registering with the following website: [email or web address]

## 2019-07-27 NOTE — PROGRESS NOTE ADULT - SUBJECTIVE AND OBJECTIVE BOX
NEPHROLOGY INTERVAL HPI/OVERNIGHT EVENTS: Hyponatremia Resolving,    More Alert,     MEDICATIONS  (STANDING):  atorvastatin 20 milliGRAM(s) Oral at bedtime  chlorhexidine 4% Liquid 1 Application(s) Topical <User Schedule>  levothyroxine 75 MICROGram(s) Oral daily  losartan 50 milliGRAM(s) Oral daily  potassium chloride    Tablet ER 20 milliEquivalent(s) Oral daily  senna 2 Tablet(s) Oral at bedtime  sodium chloride 1 Gram(s) Oral three times a day    MEDICATIONS  (PRN):  acetaminophen   Tablet .. 650 milliGRAM(s) Oral every 6 hours PRN Mild Pain (1 - 3), Moderate Pain (4 - 6)  artificial  tears Solution 1 Drop(s) Both EYES every 4 hours PRN Dry Eyes    Allergies    Allergy Status Unknown    REVIEW OF SYSTEMS:    CONSTITUTIONAL: No fever, weight loss, or fatigue  EYES: No eye pain, visual disturbances, or discharge  ENMT:  No difficulty hearing, tinnitus, vertigo; No sinus or throat pain  NECK: No pain or stiffness  BREASTS: No pain, masses, or nipple discharge  RESPIRATORY: No cough, wheezing, chills or hemoptysis; No shortness of breath  CARDIOVASCULAR: No chest pain, palpitations, dizziness, or leg swelling  GASTROINTESTINAL: No abdominal or epigastric pain. No nausea, vomiting, or hematemesis; No diarrhea or constipation. No melena or hematochezia.  GENITOURINARY: No dysuria, frequency, hematuria, or incontinence  NEUROLOGICAL: No headaches, memory loss, loss of strength, numbness, or tremors  SKIN: No itching, burning, rashes, or lesions   LYMPH NODES: No enlarged glands  ENDOCRINE: No heat or cold intolerance; No hair loss  MUSCULOSKELETAL: No joint pain or swelling; No muscle, back, or extremity pain  PSYCHIATRIC: No depression, anxiety, mood swings, or difficulty sleeping  HEME/LYMPH: No easy bruising, or bleeding gums  ALLERGY AND IMMUNOLOGIC: No hives or eczema    Vital Signs Last 24 Hrs  T(C): 36.8 (27 Jul 2019 08:27), Max: 36.8 (27 Jul 2019 08:27)  T(F): 98.3 (27 Jul 2019 08:27), Max: 98.3 (27 Jul 2019 08:27)  HR: 86 (27 Jul 2019 08:27) (86 - 98)  BP: 133/89 (27 Jul 2019 08:27) (126/76 - 142/70)  RR: 18 (27 Jul 2019 08:27) (18 - 18)  SpO2: 100% (27 Jul 2019 08:27) (97% - 100%)    PHYSICAL EXAM:    GENERAL: NAD, Frail, Elderly, Pale,   HEAD:  Atraumatic, Normocephalic  EYES: EOMI, PERRLA, conjunctiva and sclera clear  ENMT: Moist mucous membranes,   NECK: Supple, No JVD,   NERVOUS SYSTEM:  Alert & Oriented ,  CHEST/LUNG: Clear bilaterally; No rales, rhonchi, wheezing, or rubs  HEART: Iregular rate and rhythm; No murmurs, rubs, or gallops  ABDOMEN: Soft, Nontender, Nondistended; Bowel sounds present  EXTREMITIES:  2+ Peripheral Pulses, No clubbing, cyanosis, or edema  LYMPH: No lymphadenopathy noted  SKIN: No rashes or lesions    LABS:                        11.6   8.89  )-----------( 322      ( 26 Jul 2019 12:00 )             34.5     07-27    132<L>  |  99  |  8.0  ----------------------------<  120<H>  3.9   |  22.0  |  0.82    Ca    8.7      27 Jul 2019 08:21      PT/INR - ( 27 Jul 2019 08:21 )   PT: 31.4 sec;   INR: 2.65 ratio      LABS/STUDIES  --------------------------------------------------------------------------------              11.4   10.49 >-----------<  322      [07-25-19 @ 03:49]              32.1     125  |  95  |  15.0  ----------------------------<  118      [07-25-19 @ 22:21]  4.6   |  19.0  |  0.94        Ca     8.9     [07-25-19 @ 22:21]      Mg     2.7     [07-25-19 @ 09:30]      Phos  3.0     [07-25-19 @ 03:49]    TPro  7.6  /  Alb  4.3  /  TBili  0.6  /  DBili  x   /  AST  21  /  ALT  15  /  AlkPhos  97  [07-24-19 @ 17:01]    PT/INR: PT 46.0 , INR 3.84       [07-25-19 @ 03:49]  PTT: 51.1       [07-25-19 @ 03:49]    Uric acid 3.2      [07-24-19 @ 20:00]  Serum Osmolality 251      [07-24-19 @ 20:00]    Creatinine Trend:  SCr 0.94 [07-25 @ 22:21]  SCr 0.90 [07-25 @ 15:34]  SCr 0.87 [07-25 @ 09:30]  SCr 0.82 [07-25 @ 03:49]  SCr 0.82 [07-24 @ 23:17]    Urinalysis - [07-25-19 @ 03:44]      Color Yellow / Appearance Clear / SG 1.005 / pH 7.0      Gluc Negative / Ketone Negative  / Bili Negative / Urobili Negative       Blood Negative / Protein Negative / Leuk Est Trace / Nitrite Negative      RBC Negative / WBC 0-2 / Hyaline  / Gran  / Sq Epi  / Non Sq Epi Occasional / Bacteria     Urine Creatinine 19      [07-25-19 @ 03:42]  Urine Sodium 33      [07-25-19 @ 03:42]  Urine Urea Nitrogen 136      [07-25-19 @ 18:44]  Urine Osmolality 147      [07-25-19 @ 03:43]    TSH 3.67      [07-24-19 @ 20:00]    Assessment and Plan:     1. Hyponatremia  2. AFIB  3. HTN  4. HLD  5. Osteoporosis  6. S/P fall w/R wrist fx       Hyponatremia , due to poor po intake in addition to thiazide diuretic use    On oral Na+ and Kcl,    Avoid HCTZ - For future     Continue current management

## 2019-07-31 LAB
CORTICOSTEROID BINDING GLOBULIN RESULT: 3.1 MG/DL — SIGNIFICANT CHANGE UP
CORTIS F/TOTAL MFR SERPL: 27 % — SIGNIFICANT CHANGE UP
CORTIS SERPL-MCNC: 28 UG/DL — HIGH
CORTISOL, FREE RESULT: 7.6 UG/DL — HIGH

## 2020-01-31 NOTE — CONSULT NOTE ADULT - ATTENDING COMMENTS
Fluence #3 (J/Cm2 Or Mj/Cm2): 932 I discussed the above patient's condition with our in house physician assistant and reviewed relevant imaging.  Agree with the above assessment and plan. Fluence #3 (J/Cm2 Or Mj/Cm2): 937

## 2021-04-07 NOTE — DISCHARGE NOTE NURSING/CASE MANAGEMENT/SOCIAL WORK - NSDCPEPTCAREGIVEDUMATLIST _GEN_ALL_CORE
Date of Service: 04/06/2021    The patient was last seen 05/2018.  The patient has a known history of moderate sleep apnea syndrome based on polysomnography of 08/2007 at which time the AHI was 15 and there were desaturations to as low as 83% saturation.    The patient continues to derive benefit from nightly usage of auto titrate CPAP provided through [employer] and the data download of 04/05/2021 shows 100% compliance, averaging almost 9 hours of nightly usage with a median delivered pressure of 10.7 cm and a 95th percentile delivered pressure of 13 cm while on a ResMed S10 with a range of 5-18 cm.  The patient is in need of updated supplies.  We reviewed issues related to CPAP cleaning machines and CPAP maintenance.  We will have Elvia increase the baseline pressure to 9 cm and recheck a data download before her next visit in 1 year.      Dictated By: Ashok Nunez MD  Signing Provider: MD NICHOLAS Orantes/gerardo ([id number])  DD: 04/06/2021 12:33:29 TD: 04/07/2021 05:31:12    Copy Sent To:    Coumadin/Warfarin

## 2021-05-03 NOTE — PATIENT PROFILE ADULT - IS THERE A SUSPICION OF ABUSE/NEGLIGENCE?
0700 Report received from Muna , assumed care at this time  0730 D/c pt home, 2 rx given . Pt aware of f/u instructions , aware to return for any changes or concerns. No further questions upon d/c home from ed  
Pt to CT  
no

## 2023-04-14 NOTE — ED STATDOCS - ATTENDING CONTRIBUTION TO CARE
[Fever] : no fever [Chills] : no chills [Feeling Poorly] : not feeling poorly [Feeling Tired] : not feeling tired [Recent Weight Loss (___ Lbs)] : no recent weight loss [Eye Pain] : no eye pain [Red Eyes] : eyes not red [Eyesight Problems] : no eyesight problems [Discharge From Eyes] : no purulent discharge from the eyes [Earache] : no earache [Loss Of Hearing] : no hearing loss [Nosebleeds] : no nosebleeds [Nasal Discharge] : no nasal discharge [Sore Throat] : no sore throat [Hoarseness] : no hoarseness I, Gillian Neil, performed the initial face to face bedside interview with this patient regarding history of present illness, review of symptoms and relevant past medical, social and family history.  I completed an independent physical examination.  I was the initial provider who evaluated this patient. I have signed out the follow up of any pending tests (i.e. labs, radiological studies) to the ACP.  I have communicated the patient’s plan of care and disposition with the ACP.  The history, relevant review of systems, past medical and surgical history, medical decision making, and physical examination was documented by the scribe in my presence and I attest to the accuracy of the documentation. [Abdominal Pain] : no abdominal pain [Vomiting] : no vomiting [Constipation] : no constipation [Diarrhea] : no diarrhea [Dysuria] : no dysuria [Incontinence] : no incontinence [Pelvic Pain] : no pelvic pain [Vaginal Discharge] : no vaginal discharge [Confused] : no confusion [Convulsions] : no convulsions [Dizziness] : no dizziness [Fainting] : no fainting [Limb Weakness] : no limb weakness [Difficulty Walking] : no difficulty walking [Suicidal] : not suicidal [Sleep Disturbances] : no sleep disturbances [Anxiety] : no anxiety [Depression] : no depression [Muscle Weakness] : no muscle weakness [Deepening Of The Voice] : no deepening of the voice [Feelings Of Weakness] : no feelings of weakness [Easy Bleeding] : no tendency for easy bleeding [Easy Bruising] : no tendency for easy bruising [Swollen Glands] : no swollen glands

## 2024-08-28 NOTE — ED STATDOCS - CARE PLAN
Op-Site/Other
Principal Discharge DX:	Hyponatremia  Secondary Diagnosis:	Closed nondisplaced fracture of triquetrum of right wrist, initial encounter